# Patient Record
Sex: MALE | Race: WHITE | Employment: FULL TIME | URBAN - METROPOLITAN AREA
[De-identification: names, ages, dates, MRNs, and addresses within clinical notes are randomized per-mention and may not be internally consistent; named-entity substitution may affect disease eponyms.]

---

## 2018-04-12 RX ORDER — PROPRANOLOL/HYDROCHLOROTHIAZID 40 MG-25MG
1 TABLET ORAL DAILY
COMMUNITY

## 2018-04-16 PROCEDURE — 87081 CULTURE SCREEN ONLY: CPT | Performed by: INTERNAL MEDICINE

## 2018-04-16 PROCEDURE — 81003 URINALYSIS AUTO W/O SCOPE: CPT | Performed by: INTERNAL MEDICINE

## 2018-04-25 PROBLEM — K76.0 FATTY LIVER: Status: ACTIVE | Noted: 2018-04-25

## 2018-04-29 ENCOUNTER — ANESTHESIA EVENT (OUTPATIENT)
Dept: SURGERY | Facility: HOSPITAL | Age: 63
DRG: 472 | End: 2018-04-29
Payer: COMMERCIAL

## 2018-04-29 NOTE — ANESTHESIA PREPROCEDURE EVALUATION
PRE-OP EVALUATION    Patient Name: Jovita Patel    Pre-op Diagnosis: Cervical spinal stenosis [M48.02]    Procedure(s):  cervical 3-cervical 4, cervical 4-cervical 5, cervical 5-cervical 6 anterior cervical discectomy fusion        Surgeon(s) and Rol Alcohol use Yes    Comment: social       Drug use: No     Available pre-op labs reviewed.     Lab Results  Component Value Date   WBC 9.89 04/16/2018   RBC 4.83 04/16/2018   HGB 13.9 04/16/2018   HCT 42.2 04/16/2018   MCV 87.4 04/16/2018   MCH 28.8 04/16

## 2018-04-30 ENCOUNTER — APPOINTMENT (OUTPATIENT)
Dept: GENERAL RADIOLOGY | Facility: HOSPITAL | Age: 63
DRG: 472 | End: 2018-04-30
Attending: ORTHOPAEDIC SURGERY
Payer: COMMERCIAL

## 2018-04-30 ENCOUNTER — SURGERY (OUTPATIENT)
Age: 63
End: 2018-04-30

## 2018-04-30 ENCOUNTER — HOSPITAL ENCOUNTER (INPATIENT)
Facility: HOSPITAL | Age: 63
LOS: 1 days | Discharge: HOME | DRG: 472 | End: 2018-05-01
Attending: ORTHOPAEDIC SURGERY | Admitting: ORTHOPAEDIC SURGERY
Payer: COMMERCIAL

## 2018-04-30 ENCOUNTER — ANESTHESIA (OUTPATIENT)
Dept: SURGERY | Facility: HOSPITAL | Age: 63
DRG: 472 | End: 2018-04-30
Payer: COMMERCIAL

## 2018-04-30 DIAGNOSIS — M54.2 NECK PAIN: ICD-10-CM

## 2018-04-30 DIAGNOSIS — M48.02 CERVICAL SPINAL STENOSIS: ICD-10-CM

## 2018-04-30 DIAGNOSIS — M54.5 LOW BACK PAIN, UNSPECIFIED BACK PAIN LATERALITY, UNSPECIFIED CHRONICITY, WITH SCIATICA PRESENCE UNSPECIFIED: ICD-10-CM

## 2018-04-30 PROCEDURE — 0RB30ZZ EXCISION OF CERVICAL VERTEBRAL DISC, OPEN APPROACH: ICD-10-PCS | Performed by: ORTHOPAEDIC SURGERY

## 2018-04-30 PROCEDURE — 88311 DECALCIFY TISSUE: CPT | Performed by: ORTHOPAEDIC SURGERY

## 2018-04-30 PROCEDURE — 36415 COLL VENOUS BLD VENIPUNCTURE: CPT | Performed by: PHYSICIAN ASSISTANT

## 2018-04-30 PROCEDURE — A4216 STERILE WATER/SALINE, 10 ML: HCPCS

## 2018-04-30 PROCEDURE — 95861 NEEDLE EMG 2 EXTREMITIES: CPT | Performed by: ORTHOPAEDIC SURGERY

## 2018-04-30 PROCEDURE — 88304 TISSUE EXAM BY PATHOLOGIST: CPT | Performed by: ORTHOPAEDIC SURGERY

## 2018-04-30 PROCEDURE — 83735 ASSAY OF MAGNESIUM: CPT | Performed by: HOSPITALIST

## 2018-04-30 PROCEDURE — 0RG20K0 FUSION OF 2 OR MORE CERVICAL VERTEBRAL JOINTS WITH NONAUTOLOGOUS TISSUE SUBSTITUTE, ANTERIOR APPROACH, ANTERIOR COLUMN, OPEN APPROACH: ICD-10-PCS | Performed by: ORTHOPAEDIC SURGERY

## 2018-04-30 PROCEDURE — 95938 SOMATOSENSORY TESTING: CPT | Performed by: ORTHOPAEDIC SURGERY

## 2018-04-30 PROCEDURE — 85027 COMPLETE CBC AUTOMATED: CPT | Performed by: PHYSICIAN ASSISTANT

## 2018-04-30 PROCEDURE — 00BT0ZZ EXCISION OF SPINAL MENINGES, OPEN APPROACH: ICD-10-PCS | Performed by: ORTHOPAEDIC SURGERY

## 2018-04-30 PROCEDURE — 80048 BASIC METABOLIC PNL TOTAL CA: CPT | Performed by: HOSPITALIST

## 2018-04-30 PROCEDURE — 95940 IONM IN OPERATNG ROOM 15 MIN: CPT | Performed by: ORTHOPAEDIC SURGERY

## 2018-04-30 PROCEDURE — 95939 C MOTOR EVOKED UPR&LWR LIMBS: CPT | Performed by: ORTHOPAEDIC SURGERY

## 2018-04-30 PROCEDURE — 4A11X4G MONITORING OF PERIPHERAL NERVOUS ELECTRICAL ACTIVITY, INTRAOPERATIVE, EXTERNAL APPROACH: ICD-10-PCS | Performed by: ORTHOPAEDIC SURGERY

## 2018-04-30 PROCEDURE — 94660 CPAP INITIATION&MGMT: CPT

## 2018-04-30 PROCEDURE — 76001 XR FLUOROSCOPE EXAM >1 HR EXTENSIVE (CPT=76001): CPT | Performed by: ORTHOPAEDIC SURGERY

## 2018-04-30 DEVICE — ARCHON SCRW 4.0X15 SLF-TP VAR: Type: IMPLANTABLE DEVICE | Site: NECK | Status: FUNCTIONAL

## 2018-04-30 DEVICE — OSTEOCEL PRO BONE MATRIX SM: Type: IMPLANTABLE DEVICE | Site: NECK | Status: FUNCTIONAL

## 2018-04-30 DEVICE — ARCHON SCRW 4.5X15 SLF-TP VAR: Type: IMPLANTABLE DEVICE | Site: NECK | Status: FUNCTIONAL

## 2018-04-30 DEVICE — COROENT ACR MRKR 6X17X14 10DEG: Type: IMPLANTABLE DEVICE | Site: NECK | Status: FUNCTIONAL

## 2018-04-30 DEVICE — IMPLANTABLE DEVICE: Type: IMPLANTABLE DEVICE | Site: NECK | Status: FUNCTIONAL

## 2018-04-30 RX ORDER — BACITRACIN 50000 [USP'U]/1
INJECTION, POWDER, LYOPHILIZED, FOR SOLUTION INTRAMUSCULAR AS NEEDED
Status: DISCONTINUED | OUTPATIENT
Start: 2018-04-30 | End: 2018-04-30 | Stop reason: HOSPADM

## 2018-04-30 RX ORDER — ONDANSETRON 2 MG/ML
4 INJECTION INTRAMUSCULAR; INTRAVENOUS EVERY 6 HOURS PRN
Status: DISCONTINUED | OUTPATIENT
Start: 2018-04-30 | End: 2018-05-01

## 2018-04-30 RX ORDER — DIPHENHYDRAMINE HYDROCHLORIDE 50 MG/ML
12.5 INJECTION INTRAMUSCULAR; INTRAVENOUS EVERY 4 HOURS PRN
Status: DISCONTINUED | OUTPATIENT
Start: 2018-04-30 | End: 2018-05-01

## 2018-04-30 RX ORDER — POLYETHYLENE GLYCOL 3350 17 G/17G
17 POWDER, FOR SOLUTION ORAL DAILY PRN
Status: DISCONTINUED | OUTPATIENT
Start: 2018-04-30 | End: 2018-05-01

## 2018-04-30 RX ORDER — SODIUM CHLORIDE, SODIUM LACTATE, POTASSIUM CHLORIDE, CALCIUM CHLORIDE 600; 310; 30; 20 MG/100ML; MG/100ML; MG/100ML; MG/100ML
INJECTION, SOLUTION INTRAVENOUS CONTINUOUS
Status: DISCONTINUED | OUTPATIENT
Start: 2018-04-30 | End: 2018-04-30 | Stop reason: HOSPADM

## 2018-04-30 RX ORDER — DIGOXIN 250 MCG
0.25 TABLET ORAL DAILY
Status: DISCONTINUED | OUTPATIENT
Start: 2018-05-01 | End: 2018-05-01

## 2018-04-30 RX ORDER — SODIUM PHOSPHATE, DIBASIC AND SODIUM PHOSPHATE, MONOBASIC 7; 19 G/133ML; G/133ML
1 ENEMA RECTAL ONCE AS NEEDED
Status: DISCONTINUED | OUTPATIENT
Start: 2018-04-30 | End: 2018-05-01

## 2018-04-30 RX ORDER — CELECOXIB 200 MG/1
200 CAPSULE ORAL ONCE
Status: COMPLETED | OUTPATIENT
Start: 2018-04-30 | End: 2018-04-30

## 2018-04-30 RX ORDER — MIDAZOLAM HYDROCHLORIDE 1 MG/ML
INJECTION INTRAMUSCULAR; INTRAVENOUS
Status: COMPLETED
Start: 2018-04-30 | End: 2018-04-30

## 2018-04-30 RX ORDER — SODIUM CHLORIDE 9 MG/ML
INJECTION, SOLUTION INTRAVENOUS CONTINUOUS
Status: DISCONTINUED | OUTPATIENT
Start: 2018-04-30 | End: 2018-05-01

## 2018-04-30 RX ORDER — CEFAZOLIN SODIUM/WATER 2 G/20 ML
2 SYRINGE (ML) INTRAVENOUS EVERY 8 HOURS
Status: COMPLETED | OUTPATIENT
Start: 2018-04-30 | End: 2018-04-30

## 2018-04-30 RX ORDER — LEVOCETIRIZINE DIHYDROCHLORIDE 5 MG/1
5 TABLET, FILM COATED ORAL DAILY
COMMUNITY

## 2018-04-30 RX ORDER — DIPHENHYDRAMINE HYDROCHLORIDE 50 MG/ML
25 INJECTION INTRAMUSCULAR; INTRAVENOUS EVERY 4 HOURS PRN
Status: DISCONTINUED | OUTPATIENT
Start: 2018-04-30 | End: 2018-05-01

## 2018-04-30 RX ORDER — HYDROCODONE BITARTRATE AND ACETAMINOPHEN 10; 325 MG/1; MG/1
2 TABLET ORAL EVERY 4 HOURS PRN
Status: DISCONTINUED | OUTPATIENT
Start: 2018-04-30 | End: 2018-05-01

## 2018-04-30 RX ORDER — METOPROLOL SUCCINATE 50 MG/1
50 TABLET, EXTENDED RELEASE ORAL
Status: DISCONTINUED | OUTPATIENT
Start: 2018-05-01 | End: 2018-05-01

## 2018-04-30 RX ORDER — DEXAMETHASONE SODIUM PHOSPHATE 10 MG/ML
10 INJECTION, SOLUTION INTRAMUSCULAR; INTRAVENOUS EVERY 8 HOURS
Status: COMPLETED | OUTPATIENT
Start: 2018-04-30 | End: 2018-05-01

## 2018-04-30 RX ORDER — ACETAMINOPHEN 500 MG
500 TABLET ORAL ONCE
Status: ON HOLD | COMMUNITY
End: 2018-05-01

## 2018-04-30 RX ORDER — DOCUSATE SODIUM 100 MG/1
100 CAPSULE, LIQUID FILLED ORAL 2 TIMES DAILY
Status: DISCONTINUED | OUTPATIENT
Start: 2018-04-30 | End: 2018-05-01

## 2018-04-30 RX ORDER — HYDROMORPHONE HYDROCHLORIDE 1 MG/ML
0.4 INJECTION, SOLUTION INTRAMUSCULAR; INTRAVENOUS; SUBCUTANEOUS EVERY 30 MIN PRN
Status: DISCONTINUED | OUTPATIENT
Start: 2018-04-30 | End: 2018-05-01

## 2018-04-30 RX ORDER — DIPHENHYDRAMINE HCL 25 MG
25 CAPSULE ORAL EVERY 4 HOURS PRN
Status: DISCONTINUED | OUTPATIENT
Start: 2018-04-30 | End: 2018-05-01

## 2018-04-30 RX ORDER — ACETAMINOPHEN 500 MG
1000 TABLET ORAL ONCE
Status: DISCONTINUED | OUTPATIENT
Start: 2018-04-30 | End: 2018-04-30 | Stop reason: HOSPADM

## 2018-04-30 RX ORDER — HYDROCODONE BITARTRATE AND ACETAMINOPHEN 10; 325 MG/1; MG/1
1 TABLET ORAL EVERY 4 HOURS PRN
Status: DISCONTINUED | OUTPATIENT
Start: 2018-04-30 | End: 2018-05-01

## 2018-04-30 RX ORDER — NALOXONE HYDROCHLORIDE 0.4 MG/ML
80 INJECTION, SOLUTION INTRAMUSCULAR; INTRAVENOUS; SUBCUTANEOUS AS NEEDED
Status: DISCONTINUED | OUTPATIENT
Start: 2018-04-30 | End: 2018-04-30 | Stop reason: HOSPADM

## 2018-04-30 RX ORDER — DIAZEPAM 5 MG/1
5 TABLET ORAL EVERY 6 HOURS PRN
Status: DISCONTINUED | OUTPATIENT
Start: 2018-04-30 | End: 2018-05-01

## 2018-04-30 RX ORDER — CETIRIZINE HYDROCHLORIDE 10 MG/1
10 TABLET ORAL DAILY
Status: DISCONTINUED | OUTPATIENT
Start: 2018-05-01 | End: 2018-05-01

## 2018-04-30 RX ORDER — BISACODYL 10 MG
10 SUPPOSITORY, RECTAL RECTAL
Status: DISCONTINUED | OUTPATIENT
Start: 2018-04-30 | End: 2018-05-01

## 2018-04-30 RX ORDER — METOCLOPRAMIDE HYDROCHLORIDE 5 MG/ML
10 INJECTION INTRAMUSCULAR; INTRAVENOUS EVERY 6 HOURS PRN
Status: DISCONTINUED | OUTPATIENT
Start: 2018-04-30 | End: 2018-05-01

## 2018-04-30 RX ORDER — CEFAZOLIN SODIUM/WATER 2 G/20 ML
2 SYRINGE (ML) INTRAVENOUS ONCE
Status: COMPLETED | OUTPATIENT
Start: 2018-04-30 | End: 2018-04-30

## 2018-04-30 RX ORDER — CYCLOBENZAPRINE HCL 10 MG
10 TABLET ORAL 3 TIMES DAILY PRN
Status: DISCONTINUED | OUTPATIENT
Start: 2018-04-30 | End: 2018-05-01

## 2018-04-30 RX ORDER — ONDANSETRON 2 MG/ML
4 INJECTION INTRAMUSCULAR; INTRAVENOUS AS NEEDED
Status: DISCONTINUED | OUTPATIENT
Start: 2018-04-30 | End: 2018-04-30 | Stop reason: HOSPADM

## 2018-04-30 RX ORDER — ACETAMINOPHEN 500 MG
1000 TABLET ORAL ONCE
Status: ON HOLD | COMMUNITY
End: 2018-04-30

## 2018-04-30 RX ORDER — ONDANSETRON 2 MG/ML
4 INJECTION INTRAMUSCULAR; INTRAVENOUS EVERY 4 HOURS PRN
Status: ACTIVE | OUTPATIENT
Start: 2018-04-30 | End: 2018-05-01

## 2018-04-30 RX ORDER — GABAPENTIN 600 MG/1
600 TABLET ORAL ONCE
Status: COMPLETED | OUTPATIENT
Start: 2018-04-30 | End: 2018-04-30

## 2018-04-30 RX ORDER — MIDAZOLAM HYDROCHLORIDE 1 MG/ML
1 INJECTION INTRAMUSCULAR; INTRAVENOUS EVERY 5 MIN PRN
Status: DISCONTINUED | OUTPATIENT
Start: 2018-04-30 | End: 2018-04-30 | Stop reason: HOSPADM

## 2018-04-30 RX ORDER — SENNOSIDES 8.6 MG
17.2 TABLET ORAL NIGHTLY
Status: DISCONTINUED | OUTPATIENT
Start: 2018-04-30 | End: 2018-05-01

## 2018-04-30 RX ORDER — NALOXONE HYDROCHLORIDE 0.4 MG/ML
0.08 INJECTION, SOLUTION INTRAMUSCULAR; INTRAVENOUS; SUBCUTANEOUS
Status: DISCONTINUED | OUTPATIENT
Start: 2018-04-30 | End: 2018-05-01

## 2018-04-30 RX ORDER — SODIUM CHLORIDE, SODIUM LACTATE, POTASSIUM CHLORIDE, CALCIUM CHLORIDE 600; 310; 30; 20 MG/100ML; MG/100ML; MG/100ML; MG/100ML
INJECTION, SOLUTION INTRAVENOUS CONTINUOUS
Status: DISCONTINUED | OUTPATIENT
Start: 2018-04-30 | End: 2018-05-01

## 2018-04-30 RX ORDER — ONDANSETRON 2 MG/ML
4 INJECTION INTRAMUSCULAR; INTRAVENOUS ONCE
Status: COMPLETED | OUTPATIENT
Start: 2018-04-30 | End: 2018-04-30

## 2018-04-30 RX ORDER — ECHINACEA PURPUREA EXTRACT 125 MG
1 TABLET ORAL
Status: DISCONTINUED | OUTPATIENT
Start: 2018-04-30 | End: 2018-05-01

## 2018-04-30 RX ORDER — HYDROMORPHONE HYDROCHLORIDE 1 MG/ML
0.4 INJECTION, SOLUTION INTRAMUSCULAR; INTRAVENOUS; SUBCUTANEOUS EVERY 5 MIN PRN
Status: DISCONTINUED | OUTPATIENT
Start: 2018-04-30 | End: 2018-04-30 | Stop reason: HOSPADM

## 2018-04-30 RX ORDER — MEPERIDINE HYDROCHLORIDE 25 MG/ML
12.5 INJECTION INTRAMUSCULAR; INTRAVENOUS; SUBCUTANEOUS AS NEEDED
Status: DISCONTINUED | OUTPATIENT
Start: 2018-04-30 | End: 2018-04-30 | Stop reason: HOSPADM

## 2018-04-30 RX ORDER — NALBUPHINE HCL 10 MG/ML
2.5 AMPUL (ML) INJECTION EVERY 4 HOURS PRN
Status: DISCONTINUED | OUTPATIENT
Start: 2018-04-30 | End: 2018-05-01

## 2018-04-30 NOTE — CONSULTS
[facility] hospitalist initial consult  PCP;  Tiffanie Garcia MD  Consulted at request of Dr. Petty Garcia  Reason for consult medical co-management  HPI 57 yo male with many medical problems including but not limited to  hx of HTN, HL, HOLLAND, SVT, psoriasis here s/p cervical facility-administered medications on file prior to encounter. Current Outpatient Prescriptions on File Prior to Encounter:  digoxin 0.25 MG Oral Tab Take 1 tablet (0.25 mg total) by mouth daily.  Disp: 90 tablet Rfl: 3   Metoprolol Succinate ER 50 MG Oral [facility] Hospitalist  [phone]

## 2018-04-30 NOTE — PROGRESS NOTES
S:   Denies difficulty breathing or swallowing  He denies numbness or extremity pain    Inspection: Awake Alert  No acute distress. Blood pressure 120/75, pulse 92, temperature 97.7 °F (36.5 °C), temperature source Oral, resp.  rate 18, height 5' 9\" (1

## 2018-04-30 NOTE — CONSULTS
[facility] Pharmacy Note:  Pain Consult    Briana Akins is a 58year old male started on Dilaudid PCA by ARNOLDO Voss. Pharmacy was consulted to review medication profile and to discontinue previously ordered narcotics and sedatives.     Medication p

## 2018-04-30 NOTE — INTERVAL H&P NOTE
Pre-op Diagnosis: Cervical spinal stenosis [M48.02]    The above referenced H&P was reviewed by Diego Wetzel MD on 4/30/2018, the patient was examined and no significant changes have occurred in the patient's condition since the H&P was performed.   I dis

## 2018-04-30 NOTE — ANESTHESIA POSTPROCEDURE EVALUATION
Candace Patient Status:  Surgery Admit   Age/Gender 58year old male MRN [id number]   [facility] SURGERY Attending Pernell Mcknight MD   Hosp Day # 0 PCP Cyn Bolaños MD       Anesthesia Post-op Note    Procedure(s

## 2018-04-30 NOTE — BRIEF OP NOTE
Pre-Operative Diagnosis: Cervical spinal stenosis [M48.02]     Post-Operative Diagnosis: Cervical spinal stenosis [M48.02]      Procedure Performed:   Procedure(s):  cervical 3-cervical 4, cervical 4-cervical 5, cervical 5-cervical 6 anterior cervical disc

## 2018-04-30 NOTE — PROGRESS NOTES
Pt's PCP Dr Jose Luis Orellana, [facility]. Dr Guadalupe Cotto paged with new consult.

## 2018-05-01 VITALS
RESPIRATION RATE: 18 BRPM | TEMPERATURE: 98 F | DIASTOLIC BLOOD PRESSURE: 78 MMHG | SYSTOLIC BLOOD PRESSURE: 145 MMHG | BODY MASS INDEX: 28.58 KG/M2 | OXYGEN SATURATION: 95 % | WEIGHT: 193 LBS | HEART RATE: 74 BPM | HEIGHT: 69 IN

## 2018-05-01 PROCEDURE — 85027 COMPLETE CBC AUTOMATED: CPT | Performed by: PHYSICIAN ASSISTANT

## 2018-05-01 PROCEDURE — 97535 SELF CARE MNGMENT TRAINING: CPT

## 2018-05-01 PROCEDURE — 97116 GAIT TRAINING THERAPY: CPT

## 2018-05-01 PROCEDURE — 97166 OT EVAL MOD COMPLEX 45 MIN: CPT

## 2018-05-01 PROCEDURE — 97161 PT EVAL LOW COMPLEX 20 MIN: CPT

## 2018-05-01 RX ORDER — TRAMADOL HYDROCHLORIDE 50 MG/1
50 TABLET ORAL EVERY 6 HOURS PRN
Status: DISCONTINUED | OUTPATIENT
Start: 2018-05-01 | End: 2018-05-01

## 2018-05-01 NOTE — PHYSICAL THERAPY NOTE
PHYSICAL THERAPY QUICK EVALUATION - INPATIENT    Room Number: 381/381-A  Evaluation Date: 5/1/2018  Presenting Problem: s/p C3-6 ACDF 4/30/18  Physician Order: PT Eval and Treat    Problem List  Active Problems:    Cervical spinal stenosis      Past Medi MOBILITY  How much difficulty does the patient currently have. ..  -   Turning over in bed (including adjusting bedclothes, sheets and blankets)?: None   -   Sitting down on and standing up from a chair with arms (e.g., wheelchair, bedside commode, etc.): N ACDF.  Pertinent comorbidities and personal factors impacting therapy include none. Pt currently demonstrating ind with transfers, amb on levels, simulated car transfers, and able to recite 3/3 spine precautions.  Functional outcome measures completed incl

## 2018-05-01 NOTE — RESPIRATORY THERAPY NOTE
HOLLAND Equipment Usage Summary :            Set Mode :AUTO CPAP W FLEX          Usage in Hours:6;56          90% Pressure (EPAP) : 14.1           90% Insp Pressure (IPAP);           AHI : 28.5          Supplemental Oxygen :      LPM

## 2018-05-01 NOTE — PAYOR COMM NOTE
--------------  ADMISSION REVIEW     Payor: [employer]  Subscriber #:  [id number]  Authorization Number: [id number]    Admit date: 4/30/18  Admit time: 26       Admitting Physician: Elizabeth Israel MD  Attending Physician:  Daiana Najera 4/30/2018 1150 Given 0.5 mg Intravenous Libby Thurman RN    4/30/2018 1128 Given (none) Intravenous Libby Thurman RN         0.9%  NaCl infusion     Date Action Dose Route User    4/30/2018 Metsa 21 (none) Intravenous [place]            5/1/18

## 2018-05-01 NOTE — PROGRESS NOTES
S:   Denies difficulty breathing or swallowing  Bilateral hand and foot numbness still present  Strength is improving. No arm pain. Some soreness with swallowing. No other concerns. He would like to go home today.     Inspection: Awake Alert  No acute d

## 2018-05-01 NOTE — OCCUPATIONAL THERAPY NOTE
OCCUPATIONAL THERAPY QUICK EVALUATION - INPATIENT    Room Number: 381/381-A  Evaluation Date: 5/1/2018     Type of Evaluation: Quick Eval  Presenting Problem:  (ACDF C3-6)    Physician Order: IP Consult to Occupational Therapy  Reason for Therapy:  ADL/IAD None                PAIN ASSESSMENT  Rating: Unable to rate  Location:  (shoulders, neck)  Management Techniques: Relaxation;Repositioning    COGNITION  WFL    RANGE OF MOTION AND STRENGTH ASSESSMENT  Upper extremity ROM : wrists elbows and fingers WFL; sh remind himself of spine precautions if he or family notices not adhering to precautions. Understanding voiced. Patient End of Session: Up in chair;Needs met;Call light within reach;RN aware of session/findings; All patient questions and concerns address to achieve the following goals:  Patient able to toilet transfer: with supervision using RW (simulated at chair)  Patient able to dress lower extremities: with min assist using adaptive equipment.   Patient/Caregiver able to demonstrate safety with ADLS: wi

## 2018-05-01 NOTE — PROGRESS NOTES
POD #1 spine newsletter and swallowing precautions preprovided to patient, wife and daughter. Reviewed indications, side effects of pain medication/narcotics and constipation prevention.  Stressed importance of increased fluids/roughage in diet, continued u

## 2018-05-01 NOTE — PROGRESS NOTES
[facility] hospitalist daily note  Patient was seen/examined on 5/1/18    S; no cehst pain, no SOB, no abd pain, no nausea/emesis, patient reports that he is voiding    Medications in EPIC    PE;   05/01/18  1206   BP: 145/78   Pulse: 74   Resp: 18   Temp: 97.9 °

## 2018-05-02 NOTE — PAYOR COMM NOTE
--------------  DISCHARGE REVIEW    [facility]  Subscriber #:  [id number]  Authorization Number: [id number]    Admit date: 4/30/18  Admit time:  26  Discharge Date: 5/1/2018  5:57 PM     Admitting Physician: MD Krzysztof Palma

## 2018-05-28 NOTE — OPERATIVE REPORT
[facility]    PATIENT'S NAME: Esa Roya   ATTENDING PHYSICIAN: Renate Moran M.D. OPERATING PHYSICIAN: Renate Moran M.D.    PATIENT ACCOUNT#:   [de-identified]    LOCATION:  [address] [place] [address]  MEDICAL RECORD #:   [id number]       DATE OF BIRTH: neutral position. Fluoroscopy was wheeled in to albina an oblique incision at the medial border of the left sternocleidomastoid muscle. The neck was sterilely prepped and draped. An incision was made with a #15 blade.   Bovie electrocautery was used for decompressive discectomy was undertaken. Undercutting technique was used to decompress behind the vertebral bodies. Endplates were prepared. Allograft cage was sized.   A PEEK interbody cage was sized, allograft was placed inside it, and it was impacted highly degenerative disc. Distraction was undertaken. We undercut and performed undercutting technique after removing uncovertebral joints and hypertrophy with Midas Lit bur.   Undercutting curettes and 1 and 2-mm Kerrisons were used to perform similar de

## 2018-11-29 PROCEDURE — 86803 HEPATITIS C AB TEST: CPT | Performed by: INTERNAL MEDICINE

## 2018-12-11 PROBLEM — R73.03 PRE-DIABETES: Status: ACTIVE | Noted: 2018-12-11

## 2018-12-11 PROBLEM — R77.9 ELEVATED BLOOD PROTEIN: Status: ACTIVE | Noted: 2018-12-11

## 2018-12-11 PROBLEM — R74.8 ELEVATED ALKALINE PHOSPHATASE LEVEL: Status: ACTIVE | Noted: 2018-12-11

## 2018-12-14 PROBLEM — R90.89 ABNORMAL CT OF BRAIN: Status: ACTIVE | Noted: 2018-12-14

## 2020-09-04 ENCOUNTER — TELEPHONE (OUTPATIENT)
Dept: CARDIOLOGY | Age: 65
End: 2020-09-04

## 2020-12-02 PROBLEM — E11.9 NEW ONSET TYPE 2 DIABETES MELLITUS (HCC): Status: ACTIVE | Noted: 2020-12-02

## 2020-12-10 ENCOUNTER — HOSPITAL ENCOUNTER (EMERGENCY)
Age: 65
Discharge: HOME | End: 2020-12-10
Attending: EMERGENCY MEDICINE
Payer: COMMERCIAL

## 2020-12-10 VITALS
SYSTOLIC BLOOD PRESSURE: 172 MMHG | DIASTOLIC BLOOD PRESSURE: 71 MMHG | WEIGHT: 187 LBS | HEIGHT: 69 IN | RESPIRATION RATE: 20 BRPM | HEART RATE: 89 BPM | OXYGEN SATURATION: 97 % | TEMPERATURE: 99 F | BODY MASS INDEX: 27.7 KG/M2

## 2020-12-10 DIAGNOSIS — H10.212 CHEMICAL CONJUNCTIVITIS OF LEFT EYE: Primary | ICD-10-CM

## 2020-12-10 PROCEDURE — 99283 EMERGENCY DEPT VISIT LOW MDM: CPT

## 2020-12-10 RX ORDER — TETRACAINE HYDROCHLORIDE 5 MG/ML
2 SOLUTION OPHTHALMIC ONCE
Status: COMPLETED | OUTPATIENT
Start: 2020-12-10 | End: 2020-12-10

## 2020-12-22 PROBLEM — R73.03 PRE-DIABETES: Status: RESOLVED | Noted: 2018-12-11 | Resolved: 2020-12-22

## 2021-03-07 ENCOUNTER — TELEPHONE (OUTPATIENT)
Dept: CARDIOLOGY | Age: 66
End: 2021-03-07

## 2021-03-08 ENCOUNTER — OFFICE VISIT (OUTPATIENT)
Dept: CARDIOLOGY | Age: 66
End: 2021-03-08

## 2021-03-08 ENCOUNTER — APPOINTMENT (OUTPATIENT)
Dept: CARDIOLOGY | Age: 66
End: 2021-03-08

## 2021-03-08 ENCOUNTER — TELEPHONE (OUTPATIENT)
Dept: CARDIOLOGY | Age: 66
End: 2021-03-08

## 2021-03-08 ENCOUNTER — LAB SERVICES (OUTPATIENT)
Dept: LAB | Age: 66
End: 2021-03-08

## 2021-03-08 VITALS
BODY MASS INDEX: 28.49 KG/M2 | HEIGHT: 68 IN | OXYGEN SATURATION: 98 % | HEART RATE: 84 BPM | WEIGHT: 188 LBS | SYSTOLIC BLOOD PRESSURE: 154 MMHG | DIASTOLIC BLOOD PRESSURE: 80 MMHG

## 2021-03-08 DIAGNOSIS — L40.9 PSORIASIS: ICD-10-CM

## 2021-03-08 DIAGNOSIS — E78.2 MIXED HYPERLIPIDEMIA: ICD-10-CM

## 2021-03-08 DIAGNOSIS — I47.10 SVT (SUPRAVENTRICULAR TACHYCARDIA): ICD-10-CM

## 2021-03-08 DIAGNOSIS — I47.10 SVT (SUPRAVENTRICULAR TACHYCARDIA): Primary | ICD-10-CM

## 2021-03-08 DIAGNOSIS — G47.33 OSA ON CPAP: ICD-10-CM

## 2021-03-08 DIAGNOSIS — E11.9 TYPE 2 DIABETES MELLITUS WITHOUT COMPLICATION, WITHOUT LONG-TERM CURRENT USE OF INSULIN (CMD): ICD-10-CM

## 2021-03-08 DIAGNOSIS — I10 ESSENTIAL HYPERTENSION: ICD-10-CM

## 2021-03-08 DIAGNOSIS — I10 ESSENTIAL HYPERTENSION, BENIGN: ICD-10-CM

## 2021-03-08 DIAGNOSIS — R06.09 DOE (DYSPNEA ON EXERTION): Primary | ICD-10-CM

## 2021-03-08 DIAGNOSIS — I87.2 VENOUS INSUFFICIENCY OF BOTH LOWER EXTREMITIES: ICD-10-CM

## 2021-03-08 DIAGNOSIS — Z76.89 ENCOUNTER TO ESTABLISH CARE: ICD-10-CM

## 2021-03-08 DIAGNOSIS — Z87.891 FORMER TOBACCO USE: ICD-10-CM

## 2021-03-08 PROBLEM — M48.02 CERVICAL SPINAL STENOSIS: Status: ACTIVE | Noted: 2021-03-08

## 2021-03-08 PROCEDURE — 93000 ELECTROCARDIOGRAM COMPLETE: CPT | Performed by: INTERNAL MEDICINE

## 2021-03-08 PROCEDURE — 99244 OFF/OP CNSLTJ NEW/EST MOD 40: CPT | Performed by: INTERNAL MEDICINE

## 2021-03-08 PROCEDURE — 3077F SYST BP >= 140 MM HG: CPT | Performed by: INTERNAL MEDICINE

## 2021-03-08 PROCEDURE — 3079F DIAST BP 80-89 MM HG: CPT | Performed by: INTERNAL MEDICINE

## 2021-03-08 RX ORDER — DIGOXIN 0.05 MG/ML
0.25 SOLUTION ORAL
COMMUNITY
End: 2021-09-29 | Stop reason: ALTCHOICE

## 2021-03-08 RX ORDER — VIT C/B6/B5/MAGNESIUM/HERB 173 50-5-6-5MG
1 CAPSULE ORAL
COMMUNITY

## 2021-03-08 RX ORDER — LEVOCETIRIZINE DIHYDROCHLORIDE 5 MG/1
5 TABLET, FILM COATED ORAL
COMMUNITY

## 2021-03-08 RX ORDER — METOPROLOL SUCCINATE 50 MG/1
50 TABLET, EXTENDED RELEASE ORAL DAILY
Qty: 90 TABLET | Refills: 3 | Status: SHIPPED | OUTPATIENT
Start: 2021-03-08 | End: 2021-03-09 | Stop reason: ALTCHOICE

## 2021-03-08 RX ORDER — CHLORAL HYDRATE 500 MG
1000 CAPSULE ORAL
COMMUNITY

## 2021-03-08 RX ORDER — ATORVASTATIN CALCIUM 20 MG/1
20 TABLET, FILM COATED ORAL DAILY
Qty: 30 TABLET | Refills: 3 | Status: SHIPPED | OUTPATIENT
Start: 2021-03-08 | End: 2021-03-10 | Stop reason: DRUGHIGH

## 2021-03-08 RX ORDER — EZETIMIBE 10 MG/1
10 TABLET ORAL
COMMUNITY
Start: 2020-12-01 | End: 2023-06-28 | Stop reason: ALTCHOICE

## 2021-03-08 RX ORDER — METOPROLOL SUCCINATE 50 MG/1
TABLET, EXTENDED RELEASE ORAL
COMMUNITY
End: 2021-03-08 | Stop reason: SDUPTHER

## 2021-03-08 ASSESSMENT — ENCOUNTER SYMPTOMS
EYES NEGATIVE: 1
HEMATOLOGIC/LYMPHATIC NEGATIVE: 1
NEUROLOGICAL NEGATIVE: 1
ALLERGIC/IMMUNOLOGIC NEGATIVE: 1
CONSTITUTIONAL NEGATIVE: 1
PSYCHIATRIC NEGATIVE: 1
SHORTNESS OF BREATH: 1
GASTROINTESTINAL NEGATIVE: 1
ENDOCRINE NEGATIVE: 1

## 2021-03-09 ENCOUNTER — TELEPHONE (OUTPATIENT)
Dept: CARDIOLOGY | Age: 66
End: 2021-03-09

## 2021-03-09 DIAGNOSIS — Z01.812 PRE-PROCEDURAL LABORATORY EXAMINATION: Primary | ICD-10-CM

## 2021-03-10 RX ORDER — ATORVASTATIN CALCIUM 20 MG/1
20 TABLET, FILM COATED ORAL
Qty: 30 TABLET | Refills: 3 | Status: SHIPPED | COMMUNITY
Start: 2021-03-15 | End: 2022-01-18

## 2021-07-23 ENCOUNTER — APPOINTMENT (OUTPATIENT)
Dept: ULTRASOUND IMAGING | Age: 66
End: 2021-07-23
Attending: INTERNAL MEDICINE

## 2021-08-25 ENCOUNTER — APPOINTMENT (OUTPATIENT)
Dept: ULTRASOUND IMAGING | Age: 66
End: 2021-08-25
Attending: INTERNAL MEDICINE

## 2021-09-10 ENCOUNTER — APPOINTMENT (OUTPATIENT)
Dept: ULTRASOUND IMAGING | Age: 66
End: 2021-09-10
Attending: INTERNAL MEDICINE

## 2021-09-28 ENCOUNTER — E-ADVICE (OUTPATIENT)
Dept: CARDIOLOGY | Age: 66
End: 2021-09-28

## 2021-09-28 DIAGNOSIS — I47.10 SVT (SUPRAVENTRICULAR TACHYCARDIA): ICD-10-CM

## 2021-09-28 DIAGNOSIS — R06.09 DOE (DYSPNEA ON EXERTION): Primary | ICD-10-CM

## 2021-09-28 DIAGNOSIS — I10 ESSENTIAL HYPERTENSION: ICD-10-CM

## 2021-09-28 DIAGNOSIS — Z01.812 PRE-PROCEDURAL LABORATORY EXAMINATION: Primary | ICD-10-CM

## 2021-09-28 DIAGNOSIS — E11.9 TYPE 2 DIABETES MELLITUS WITHOUT COMPLICATION, WITHOUT LONG-TERM CURRENT USE OF INSULIN (CMD): ICD-10-CM

## 2021-09-28 DIAGNOSIS — I87.2 VENOUS INSUFFICIENCY OF BOTH LOWER EXTREMITIES: ICD-10-CM

## 2021-09-29 ENCOUNTER — TELEPHONE (OUTPATIENT)
Dept: CARDIOLOGY | Age: 66
End: 2021-09-29

## 2021-09-29 DIAGNOSIS — I47.10 SVT (SUPRAVENTRICULAR TACHYCARDIA): ICD-10-CM

## 2021-09-29 DIAGNOSIS — I10 ESSENTIAL HYPERTENSION: Primary | ICD-10-CM

## 2021-09-29 DIAGNOSIS — I47.10 PSVT (PAROXYSMAL SUPRAVENTRICULAR TACHYCARDIA): ICD-10-CM

## 2021-09-29 DIAGNOSIS — E11.9 TYPE 2 DIABETES MELLITUS WITHOUT COMPLICATION, WITHOUT LONG-TERM CURRENT USE OF INSULIN (CMD): ICD-10-CM

## 2021-09-29 DIAGNOSIS — E78.2 MIXED HYPERLIPIDEMIA: ICD-10-CM

## 2021-09-29 RX ORDER — DIGOXIN 250 MCG
250 TABLET ORAL DAILY
Qty: 90 TABLET | Refills: 1 | Status: SHIPPED | OUTPATIENT
Start: 2021-09-29 | End: 2022-03-28 | Stop reason: SDUPTHER

## 2021-09-30 ENCOUNTER — TELEPHONE (OUTPATIENT)
Dept: CARDIOLOGY | Age: 66
End: 2021-09-30

## 2021-10-06 ENCOUNTER — E-ADVICE (OUTPATIENT)
Dept: CARDIOLOGY | Age: 66
End: 2021-10-06

## 2021-10-06 ENCOUNTER — ANCILLARY PROCEDURE (OUTPATIENT)
Dept: ULTRASOUND IMAGING | Age: 66
End: 2021-10-06
Attending: INTERNAL MEDICINE

## 2021-10-06 DIAGNOSIS — Z87.891 FORMER TOBACCO USE: ICD-10-CM

## 2021-10-06 DIAGNOSIS — Z76.89 ENCOUNTER TO ESTABLISH CARE: ICD-10-CM

## 2021-10-06 PROCEDURE — 76775 US EXAM ABDO BACK WALL LIM: CPT | Performed by: RADIOLOGY

## 2021-10-12 ENCOUNTER — LAB SERVICES (OUTPATIENT)
Dept: LAB | Age: 66
End: 2021-10-12

## 2021-10-12 DIAGNOSIS — Z01.812 PRE-PROCEDURAL LABORATORY EXAMINATION: ICD-10-CM

## 2021-10-12 LAB
SARS-COV-2 RNA RESP QL NAA+PROBE: NOT DETECTED
SERVICE CMNT-IMP: NORMAL
SERVICE CMNT-IMP: NORMAL

## 2021-10-12 PROCEDURE — U0005 INFEC AGEN DETEC AMPLI PROBE: HCPCS | Performed by: CLINICAL MEDICAL LABORATORY

## 2021-10-12 PROCEDURE — U0003 INFECTIOUS AGENT DETECTION BY NUCLEIC ACID (DNA OR RNA); SEVERE ACUTE RESPIRATORY SYNDROME CORONAVIRUS 2 (SARS-COV-2) (CORONAVIRUS DISEASE [COVID-19]), AMPLIFIED PROBE TECHNIQUE, MAKING USE OF HIGH THROUGHPUT TECHNOLOGIES AS DESCRIBED BY CMS-2020-01-R: HCPCS | Performed by: CLINICAL MEDICAL LABORATORY

## 2021-10-14 ENCOUNTER — APPOINTMENT (OUTPATIENT)
Dept: GENERAL RADIOLOGY | Age: 66
End: 2021-10-14
Attending: INTERNAL MEDICINE

## 2021-10-14 ENCOUNTER — APPOINTMENT (OUTPATIENT)
Dept: CARDIOLOGY | Age: 66
End: 2021-10-14
Attending: INTERNAL MEDICINE

## 2021-10-18 ENCOUNTER — APPOINTMENT (OUTPATIENT)
Dept: CARDIOLOGY | Age: 66
End: 2021-10-18
Attending: INTERNAL MEDICINE

## 2021-10-19 ENCOUNTER — ANCILLARY PROCEDURE (OUTPATIENT)
Dept: GENERAL RADIOLOGY | Age: 66
End: 2021-10-19
Attending: INTERNAL MEDICINE

## 2021-10-19 ENCOUNTER — ANCILLARY PROCEDURE (OUTPATIENT)
Dept: CARDIOLOGY | Age: 66
End: 2021-10-19
Attending: INTERNAL MEDICINE

## 2021-10-19 DIAGNOSIS — R06.09 DOE (DYSPNEA ON EXERTION): ICD-10-CM

## 2021-10-19 DIAGNOSIS — I47.10 SVT (SUPRAVENTRICULAR TACHYCARDIA): ICD-10-CM

## 2021-10-19 DIAGNOSIS — E11.9 TYPE 2 DIABETES MELLITUS WITHOUT COMPLICATION, WITHOUT LONG-TERM CURRENT USE OF INSULIN (CMD): ICD-10-CM

## 2021-10-19 DIAGNOSIS — I10 ESSENTIAL HYPERTENSION: ICD-10-CM

## 2021-10-19 DIAGNOSIS — I87.2 VENOUS INSUFFICIENCY OF BOTH LOWER EXTREMITIES: ICD-10-CM

## 2021-10-19 DIAGNOSIS — I10 ESSENTIAL HYPERTENSION: Primary | ICD-10-CM

## 2021-10-19 PROCEDURE — A9500 TC99M SESTAMIBI: HCPCS | Performed by: INTERNAL MEDICINE

## 2021-10-19 PROCEDURE — 93015 CV STRESS TEST SUPVJ I&R: CPT | Performed by: INTERNAL MEDICINE

## 2021-10-19 PROCEDURE — 78452 HT MUSCLE IMAGE SPECT MULT: CPT | Performed by: INTERNAL MEDICINE

## 2021-10-19 PROCEDURE — G1004 CDSM NDSC: HCPCS | Performed by: INTERNAL MEDICINE

## 2021-10-19 RX ORDER — TETRAKIS(2-METHOXYISOBUTYLISOCYANIDE)COPPER(I) TETRAFLUOROBORATE 1 MG/ML
24 INJECTION, POWDER, LYOPHILIZED, FOR SOLUTION INTRAVENOUS ONCE
Status: COMPLETED | OUTPATIENT
Start: 2021-10-19 | End: 2021-10-19

## 2021-10-19 RX ORDER — REGADENOSON 0.08 MG/ML
0.4 INJECTION, SOLUTION INTRAVENOUS ONCE
Status: COMPLETED | OUTPATIENT
Start: 2021-10-19 | End: 2021-10-19

## 2021-10-19 RX ORDER — TETRAKIS(2-METHOXYISOBUTYLISOCYANIDE)COPPER(I) TETRAFLUOROBORATE 1 MG/ML
8.9 INJECTION, POWDER, LYOPHILIZED, FOR SOLUTION INTRAVENOUS ONCE
Status: COMPLETED | OUTPATIENT
Start: 2021-10-19 | End: 2021-10-19

## 2021-10-19 RX ADMIN — TETRAKIS(2-METHOXYISOBUTYLISOCYANIDE)COPPER(I) TETRAFLUOROBORATE 8.9 MILLICURIE: 1 INJECTION, POWDER, LYOPHILIZED, FOR SOLUTION INTRAVENOUS at 07:45

## 2021-10-19 RX ADMIN — TETRAKIS(2-METHOXYISOBUTYLISOCYANIDE)COPPER(I) TETRAFLUOROBORATE 23.5 MILLICURIE: 1 INJECTION, POWDER, LYOPHILIZED, FOR SOLUTION INTRAVENOUS at 09:35

## 2021-10-19 RX ADMIN — REGADENOSON 0.4 MG: 0.08 INJECTION, SOLUTION INTRAVENOUS at 09:24

## 2021-10-20 ENCOUNTER — TELEPHONE (OUTPATIENT)
Dept: CARDIOLOGY | Age: 66
End: 2021-10-20

## 2021-10-20 LAB — STRESS TARGET HR: 153 BPM

## 2021-10-28 ENCOUNTER — APPOINTMENT (OUTPATIENT)
Dept: CARDIOLOGY | Age: 66
End: 2021-10-28
Attending: INTERNAL MEDICINE

## 2021-11-01 ENCOUNTER — APPOINTMENT (OUTPATIENT)
Dept: CARDIOLOGY | Age: 66
End: 2021-11-01
Attending: INTERNAL MEDICINE

## 2021-11-05 ENCOUNTER — ANCILLARY PROCEDURE (OUTPATIENT)
Dept: CARDIOLOGY | Age: 66
End: 2021-11-05
Attending: INTERNAL MEDICINE

## 2021-11-05 DIAGNOSIS — E11.9 TYPE 2 DIABETES MELLITUS WITHOUT COMPLICATION, WITHOUT LONG-TERM CURRENT USE OF INSULIN (CMD): ICD-10-CM

## 2021-11-05 DIAGNOSIS — I47.10 SVT (SUPRAVENTRICULAR TACHYCARDIA): ICD-10-CM

## 2021-11-05 DIAGNOSIS — R06.09 DOE (DYSPNEA ON EXERTION): ICD-10-CM

## 2021-11-05 DIAGNOSIS — I87.2 VENOUS INSUFFICIENCY OF BOTH LOWER EXTREMITIES: ICD-10-CM

## 2021-11-05 DIAGNOSIS — I10 ESSENTIAL HYPERTENSION: ICD-10-CM

## 2021-11-05 PROCEDURE — 93306 TTE W/DOPPLER COMPLETE: CPT | Performed by: INTERNAL MEDICINE

## 2021-11-11 ENCOUNTER — TELEPHONE (OUTPATIENT)
Dept: CARDIOLOGY | Age: 66
End: 2021-11-11

## 2021-12-15 ENCOUNTER — APPOINTMENT (OUTPATIENT)
Dept: CARDIOLOGY | Age: 66
End: 2021-12-15

## 2021-12-15 ASSESSMENT — ENCOUNTER SYMPTOMS
NEUROLOGICAL NEGATIVE: 1
CONSTITUTIONAL NEGATIVE: 1
EYES NEGATIVE: 1
HEMATOLOGIC/LYMPHATIC NEGATIVE: 1
PSYCHIATRIC NEGATIVE: 1
GASTROINTESTINAL NEGATIVE: 1
ALLERGIC/IMMUNOLOGIC NEGATIVE: 1
ENDOCRINE NEGATIVE: 1
SHORTNESS OF BREATH: 1

## 2022-01-18 RX ORDER — ATORVASTATIN CALCIUM 20 MG/1
TABLET, FILM COATED ORAL
Qty: 90 TABLET | Refills: 3 | Status: SHIPPED | OUTPATIENT
Start: 2022-01-18 | End: 2022-11-17 | Stop reason: SDUPTHER

## 2022-01-25 ENCOUNTER — APPOINTMENT (OUTPATIENT)
Dept: CARDIOLOGY | Age: 67
End: 2022-01-25
Attending: INTERNAL MEDICINE

## 2022-02-05 ASSESSMENT — ENCOUNTER SYMPTOMS
HEMATOLOGIC/LYMPHATIC NEGATIVE: 1
PSYCHIATRIC NEGATIVE: 1
EYES NEGATIVE: 1
ENDOCRINE NEGATIVE: 1
SHORTNESS OF BREATH: 1
CONSTITUTIONAL NEGATIVE: 1
GASTROINTESTINAL NEGATIVE: 1
NEUROLOGICAL NEGATIVE: 1
ALLERGIC/IMMUNOLOGIC NEGATIVE: 1

## 2022-02-07 ENCOUNTER — OFFICE VISIT (OUTPATIENT)
Dept: CARDIOLOGY | Age: 67
End: 2022-02-07

## 2022-02-07 DIAGNOSIS — I10 ESSENTIAL HYPERTENSION, BENIGN: ICD-10-CM

## 2022-02-07 DIAGNOSIS — E11.9 TYPE 2 DIABETES MELLITUS WITHOUT COMPLICATION, WITHOUT LONG-TERM CURRENT USE OF INSULIN (CMD): ICD-10-CM

## 2022-02-07 DIAGNOSIS — E78.2 MIXED HYPERLIPIDEMIA: ICD-10-CM

## 2022-02-07 DIAGNOSIS — I47.10 SVT (SUPRAVENTRICULAR TACHYCARDIA): Primary | ICD-10-CM

## 2022-02-07 DIAGNOSIS — I87.2 VENOUS INSUFFICIENCY (CHRONIC) (PERIPHERAL): ICD-10-CM

## 2022-02-07 DIAGNOSIS — I47.10 PSVT (PAROXYSMAL SUPRAVENTRICULAR TACHYCARDIA): ICD-10-CM

## 2022-02-07 PROCEDURE — 93000 ELECTROCARDIOGRAM COMPLETE: CPT | Performed by: INTERNAL MEDICINE

## 2022-02-09 ASSESSMENT — ENCOUNTER SYMPTOMS
PSYCHIATRIC NEGATIVE: 1
EYES NEGATIVE: 1
ENDOCRINE NEGATIVE: 1
ALLERGIC/IMMUNOLOGIC NEGATIVE: 1
HEMATOLOGIC/LYMPHATIC NEGATIVE: 1
GASTROINTESTINAL NEGATIVE: 1
CONSTITUTIONAL NEGATIVE: 1
NEUROLOGICAL NEGATIVE: 1

## 2022-02-10 ENCOUNTER — OFFICE VISIT (OUTPATIENT)
Dept: CARDIOLOGY | Age: 67
End: 2022-02-10

## 2022-02-10 ENCOUNTER — ANCILLARY PROCEDURE (OUTPATIENT)
Dept: CARDIOLOGY | Age: 67
End: 2022-02-10
Attending: INTERNAL MEDICINE

## 2022-02-10 VITALS
SYSTOLIC BLOOD PRESSURE: 138 MMHG | HEART RATE: 65 BPM | WEIGHT: 183 LBS | DIASTOLIC BLOOD PRESSURE: 76 MMHG | BODY MASS INDEX: 27.74 KG/M2 | HEIGHT: 68 IN | OXYGEN SATURATION: 98 %

## 2022-02-10 DIAGNOSIS — I47.10 PSVT (PAROXYSMAL SUPRAVENTRICULAR TACHYCARDIA): ICD-10-CM

## 2022-02-10 DIAGNOSIS — E78.2 MIXED HYPERLIPIDEMIA: ICD-10-CM

## 2022-02-10 DIAGNOSIS — I10 ESSENTIAL HYPERTENSION, BENIGN: ICD-10-CM

## 2022-02-10 DIAGNOSIS — L40.9 PSORIASIS: ICD-10-CM

## 2022-02-10 DIAGNOSIS — I47.10 SVT (SUPRAVENTRICULAR TACHYCARDIA): ICD-10-CM

## 2022-02-10 DIAGNOSIS — I47.10 SVT (SUPRAVENTRICULAR TACHYCARDIA): Primary | ICD-10-CM

## 2022-02-10 DIAGNOSIS — E11.9 TYPE 2 DIABETES MELLITUS WITHOUT COMPLICATION, WITHOUT LONG-TERM CURRENT USE OF INSULIN (CMD): ICD-10-CM

## 2022-02-10 PROCEDURE — 99214 OFFICE O/P EST MOD 30 MIN: CPT | Performed by: INTERNAL MEDICINE

## 2022-02-10 RX ORDER — BLOOD-GLUCOSE METER
EACH MISCELLANEOUS
COMMUNITY
Start: 2021-11-30

## 2022-02-10 RX ORDER — LANCETS
EACH MISCELLANEOUS
COMMUNITY
Start: 2022-01-12

## 2022-02-11 ENCOUNTER — TELEPHONE (OUTPATIENT)
Dept: CARDIOLOGY | Age: 67
End: 2022-02-11

## 2022-02-11 ASSESSMENT — ENCOUNTER SYMPTOMS: RESPIRATORY NEGATIVE: 1

## 2022-03-14 ENCOUNTER — APPOINTMENT (OUTPATIENT)
Dept: CARDIOLOGY | Age: 67
End: 2022-03-14
Attending: INTERNAL MEDICINE

## 2022-03-28 DIAGNOSIS — I48.91 ATRIAL FIBRILLATION, UNSPECIFIED TYPE (CMD): ICD-10-CM

## 2022-03-28 DIAGNOSIS — I47.10 SVT (SUPRAVENTRICULAR TACHYCARDIA): ICD-10-CM

## 2022-03-29 RX ORDER — DIGOXIN 250 MCG
250 TABLET ORAL DAILY
Qty: 30 TABLET | Refills: 0 | Status: SHIPPED | OUTPATIENT
Start: 2022-03-29 | End: 2022-04-25 | Stop reason: SDUPTHER

## 2022-04-22 ENCOUNTER — LAB SERVICES (OUTPATIENT)
Dept: LAB | Age: 67
End: 2022-04-22

## 2022-04-22 ENCOUNTER — LAB REQUISITION (OUTPATIENT)
Dept: LAB | Age: 67
End: 2022-04-22

## 2022-04-22 DIAGNOSIS — I47.10 SVT (SUPRAVENTRICULAR TACHYCARDIA): ICD-10-CM

## 2022-04-22 DIAGNOSIS — I47.10 SUPRAVENTRICULAR TACHYCARDIA: ICD-10-CM

## 2022-04-22 LAB
DIGOXIN SERPL-MCNC: 2 NG/ML (ref 0.8–2.1)
DIGOXIN SERPL-MCNC: 2 NG/ML (ref 0.8–2.1)
DIGOXIN: NORMAL NG/ML

## 2022-04-22 PROCEDURE — 80162 ASSAY OF DIGOXIN TOTAL: CPT | Performed by: CLINICAL MEDICAL LABORATORY

## 2022-04-22 PROCEDURE — 36415 COLL VENOUS BLD VENIPUNCTURE: CPT | Performed by: INTERNAL MEDICINE

## 2022-04-25 RX ORDER — DIGOXIN 250 MCG
250 TABLET ORAL DAILY
Qty: 90 TABLET | Refills: 1 | Status: SHIPPED | OUTPATIENT
Start: 2022-04-25 | End: 2022-10-14

## 2022-10-13 ENCOUNTER — TELEPHONE (OUTPATIENT)
Dept: CARDIOLOGY | Age: 67
End: 2022-10-13

## 2022-10-13 DIAGNOSIS — I47.10 SVT (SUPRAVENTRICULAR TACHYCARDIA): Primary | ICD-10-CM

## 2022-10-13 DIAGNOSIS — Z79.899 MEDICATION MANAGEMENT: ICD-10-CM

## 2022-10-14 RX ORDER — DIGOXIN 250 MCG
TABLET ORAL
Qty: 90 TABLET | Refills: 0 | Status: SHIPPED | OUTPATIENT
Start: 2022-10-14 | End: 2022-12-18 | Stop reason: SDUPTHER

## 2022-11-17 RX ORDER — ATORVASTATIN CALCIUM 20 MG/1
20 TABLET, FILM COATED ORAL DAILY
Qty: 90 TABLET | Refills: 0 | Status: SHIPPED | OUTPATIENT
Start: 2022-11-17 | End: 2022-12-18 | Stop reason: SDUPTHER

## 2022-12-18 DIAGNOSIS — Z79.899 MEDICATION MANAGEMENT: ICD-10-CM

## 2022-12-18 DIAGNOSIS — I47.10 SVT (SUPRAVENTRICULAR TACHYCARDIA): Primary | ICD-10-CM

## 2022-12-18 DIAGNOSIS — I47.10 PSVT (PAROXYSMAL SUPRAVENTRICULAR TACHYCARDIA): ICD-10-CM

## 2022-12-19 RX ORDER — DIGOXIN 250 MCG
250 TABLET ORAL DAILY
Qty: 90 TABLET | Refills: 0 | Status: SHIPPED | OUTPATIENT
Start: 2022-12-19 | End: 2023-03-29 | Stop reason: SDUPTHER

## 2022-12-19 RX ORDER — ATORVASTATIN CALCIUM 20 MG/1
20 TABLET, FILM COATED ORAL DAILY
Qty: 90 TABLET | Refills: 0 | Status: SHIPPED | OUTPATIENT
Start: 2022-12-19 | End: 2023-03-29 | Stop reason: SDUPTHER

## 2022-12-27 ENCOUNTER — OFFICE VISIT (OUTPATIENT)
Dept: CARDIOLOGY | Age: 67
End: 2022-12-27
Attending: INTERNAL MEDICINE

## 2022-12-27 ENCOUNTER — LAB SERVICES (OUTPATIENT)
Dept: LAB | Age: 67
End: 2022-12-27

## 2022-12-27 DIAGNOSIS — Z79.899 MEDICATION MANAGEMENT: ICD-10-CM

## 2022-12-27 DIAGNOSIS — I47.10 SVT (SUPRAVENTRICULAR TACHYCARDIA): ICD-10-CM

## 2022-12-27 DIAGNOSIS — I47.10 PSVT (PAROXYSMAL SUPRAVENTRICULAR TACHYCARDIA): ICD-10-CM

## 2022-12-27 DIAGNOSIS — Z79.899 MEDICATION MANAGEMENT: Primary | ICD-10-CM

## 2022-12-27 LAB — DIGOXIN SERPL-MCNC: 1.4 NG/ML (ref 0.8–2.1)

## 2022-12-27 PROCEDURE — 36415 COLL VENOUS BLD VENIPUNCTURE: CPT | Performed by: INTERNAL MEDICINE

## 2022-12-27 PROCEDURE — 80162 ASSAY OF DIGOXIN TOTAL: CPT | Performed by: CLINICAL MEDICAL LABORATORY

## 2022-12-27 PROCEDURE — 93000 ELECTROCARDIOGRAM COMPLETE: CPT | Performed by: INTERNAL MEDICINE

## 2023-02-09 ENCOUNTER — TELEPHONE (OUTPATIENT)
Dept: CARDIOLOGY | Age: 68
End: 2023-02-09

## 2023-02-10 ENCOUNTER — APPOINTMENT (OUTPATIENT)
Dept: CARDIOLOGY | Age: 68
End: 2023-02-10

## 2023-03-30 RX ORDER — ATORVASTATIN CALCIUM 20 MG/1
20 TABLET, FILM COATED ORAL DAILY
Qty: 90 TABLET | Refills: 1 | Status: SHIPPED | OUTPATIENT
Start: 2023-03-30 | End: 2023-06-29 | Stop reason: SDUPTHER

## 2023-03-30 RX ORDER — DIGOXIN 250 MCG
250 TABLET ORAL DAILY
Qty: 90 TABLET | Refills: 1 | Status: SHIPPED | OUTPATIENT
Start: 2023-03-30 | End: 2023-06-29 | Stop reason: SDUPTHER

## 2023-06-28 PROBLEM — R80.9 DIABETES MELLITUS WITH MICROALBUMINURIA (CMD): Status: ACTIVE | Noted: 2022-10-19

## 2023-06-28 PROBLEM — E11.29 DIABETES MELLITUS WITH MICROALBUMINURIA (CMD): Status: ACTIVE | Noted: 2022-10-19

## 2023-06-28 ASSESSMENT — ENCOUNTER SYMPTOMS
ENDOCRINE NEGATIVE: 1
EYES NEGATIVE: 1
CONSTITUTIONAL NEGATIVE: 1
HEMATOLOGIC/LYMPHATIC NEGATIVE: 1
PSYCHIATRIC NEGATIVE: 1
ALLERGIC/IMMUNOLOGIC NEGATIVE: 1
GASTROINTESTINAL NEGATIVE: 1
RESPIRATORY NEGATIVE: 1
NEUROLOGICAL NEGATIVE: 1

## 2023-06-29 ENCOUNTER — OFFICE VISIT (OUTPATIENT)
Dept: CARDIOLOGY | Age: 68
End: 2023-06-29

## 2023-06-29 ENCOUNTER — TELEPHONE (OUTPATIENT)
Dept: CARDIOLOGY | Age: 68
End: 2023-06-29

## 2023-06-29 ENCOUNTER — E-ADVICE (OUTPATIENT)
Dept: CARDIOLOGY | Age: 68
End: 2023-06-29

## 2023-06-29 VITALS
SYSTOLIC BLOOD PRESSURE: 140 MMHG | RESPIRATION RATE: 18 BRPM | OXYGEN SATURATION: 98 % | DIASTOLIC BLOOD PRESSURE: 70 MMHG | HEIGHT: 68 IN | BODY MASS INDEX: 26.91 KG/M2 | WEIGHT: 177.58 LBS | HEART RATE: 65 BPM

## 2023-06-29 DIAGNOSIS — I47.10 SVT (SUPRAVENTRICULAR TACHYCARDIA): ICD-10-CM

## 2023-06-29 DIAGNOSIS — R80.9 MICROALBUMINURIA: ICD-10-CM

## 2023-06-29 DIAGNOSIS — E11.29 DIABETES MELLITUS WITH MICROALBUMINURIA (CMD): ICD-10-CM

## 2023-06-29 DIAGNOSIS — E11.9 TYPE 2 DIABETES MELLITUS WITHOUT COMPLICATION, WITHOUT LONG-TERM CURRENT USE OF INSULIN (CMD): ICD-10-CM

## 2023-06-29 DIAGNOSIS — R80.9 DIABETES MELLITUS WITH MICROALBUMINURIA (CMD): ICD-10-CM

## 2023-06-29 DIAGNOSIS — E78.2 MIXED HYPERLIPIDEMIA: ICD-10-CM

## 2023-06-29 DIAGNOSIS — I10 ESSENTIAL HYPERTENSION, BENIGN: ICD-10-CM

## 2023-06-29 DIAGNOSIS — I47.10 PSVT (PAROXYSMAL SUPRAVENTRICULAR TACHYCARDIA): Primary | ICD-10-CM

## 2023-06-29 PROCEDURE — 93000 ELECTROCARDIOGRAM COMPLETE: CPT | Performed by: INTERNAL MEDICINE

## 2023-06-29 PROCEDURE — 99214 OFFICE O/P EST MOD 30 MIN: CPT | Performed by: INTERNAL MEDICINE

## 2023-06-29 RX ORDER — DIGOXIN 250 MCG
250 TABLET ORAL DAILY
Qty: 90 TABLET | Refills: 3 | Status: SHIPPED | OUTPATIENT
Start: 2023-06-29

## 2023-06-29 RX ORDER — LISINOPRIL 5 MG/1
5 TABLET ORAL DAILY
Qty: 30 TABLET | Refills: 3 | Status: SHIPPED | OUTPATIENT
Start: 2023-06-29 | End: 2023-10-13 | Stop reason: SDUPTHER

## 2023-06-29 RX ORDER — ATORVASTATIN CALCIUM 20 MG/1
20 TABLET, FILM COATED ORAL DAILY
Qty: 90 TABLET | Refills: 3 | Status: SHIPPED | OUTPATIENT
Start: 2023-06-29

## 2023-06-29 RX ORDER — EMPAGLIFLOZIN 25 MG/1
TABLET, FILM COATED ORAL
COMMUNITY
Start: 2023-05-20

## 2023-06-29 ASSESSMENT — PAIN SCALES - GENERAL: PAINLEVEL: 5

## 2023-07-06 ENCOUNTER — E-ADVICE (OUTPATIENT)
Dept: CARDIOLOGY | Age: 68
End: 2023-07-06

## 2023-10-13 ENCOUNTER — E-ADVICE (OUTPATIENT)
Dept: CARDIOLOGY | Age: 68
End: 2023-10-13

## 2023-10-13 RX ORDER — LISINOPRIL 5 MG/1
5 TABLET ORAL DAILY
Qty: 90 TABLET | Refills: 3 | Status: SHIPPED | OUTPATIENT
Start: 2023-10-13

## 2023-10-19 ENCOUNTER — E-ADVICE (OUTPATIENT)
Dept: CARDIOLOGY | Age: 68
End: 2023-10-19

## 2024-03-29 ENCOUNTER — E-ADVICE (OUTPATIENT)
Dept: CARDIOLOGY | Age: 69
End: 2024-03-29

## 2024-04-29 RX ORDER — ATORVASTATIN CALCIUM 20 MG/1
20 TABLET, FILM COATED ORAL DAILY
Qty: 90 TABLET | Refills: 3 | Status: SHIPPED | OUTPATIENT
Start: 2024-04-29

## 2024-06-06 DIAGNOSIS — I47.10 PSVT (PAROXYSMAL SUPRAVENTRICULAR TACHYCARDIA) (CMD): ICD-10-CM

## 2024-06-06 DIAGNOSIS — E78.2 MIXED HYPERLIPIDEMIA: Primary | ICD-10-CM

## 2024-06-06 DIAGNOSIS — I47.10 SVT (SUPRAVENTRICULAR TACHYCARDIA) (CMD): ICD-10-CM

## 2024-06-07 ENCOUNTER — E-ADVICE (OUTPATIENT)
Dept: CARDIOLOGY | Age: 69
End: 2024-06-07

## 2024-06-07 RX ORDER — DIGOXIN 250 MCG
250 TABLET ORAL DAILY
Qty: 90 TABLET | Refills: 1 | Status: SHIPPED | OUTPATIENT
Start: 2024-06-07

## 2024-06-07 RX ORDER — ATORVASTATIN CALCIUM 20 MG/1
20 TABLET, FILM COATED ORAL DAILY
Qty: 90 TABLET | Refills: 1 | Status: SHIPPED | OUTPATIENT
Start: 2024-06-07

## 2024-07-10 ENCOUNTER — APPOINTMENT (OUTPATIENT)
Dept: CARDIOLOGY | Age: 69
End: 2024-07-10

## 2024-07-11 DIAGNOSIS — I47.10 PSVT (PAROXYSMAL SUPRAVENTRICULAR TACHYCARDIA) (CMD): ICD-10-CM

## 2024-07-11 DIAGNOSIS — I47.10 SVT (SUPRAVENTRICULAR TACHYCARDIA) (CMD): ICD-10-CM

## 2024-07-11 DIAGNOSIS — E78.2 MIXED HYPERLIPIDEMIA: ICD-10-CM

## 2024-07-16 RX ORDER — LISINOPRIL 5 MG/1
5 TABLET ORAL DAILY
Qty: 90 TABLET | Refills: 3 | Status: SHIPPED | OUTPATIENT
Start: 2024-07-16

## 2024-07-16 RX ORDER — ATORVASTATIN CALCIUM 20 MG/1
20 TABLET, FILM COATED ORAL DAILY
Qty: 90 TABLET | Refills: 3 | Status: SHIPPED | OUTPATIENT
Start: 2024-07-16

## 2024-07-19 ENCOUNTER — LAB SERVICES (OUTPATIENT)
Dept: LAB | Age: 69
End: 2024-07-19

## 2024-07-19 ENCOUNTER — OFFICE VISIT (OUTPATIENT)
Dept: CARDIOLOGY | Age: 69
End: 2024-07-19

## 2024-07-19 VITALS
WEIGHT: 170 LBS | OXYGEN SATURATION: 99 % | BODY MASS INDEX: 25.76 KG/M2 | RESPIRATION RATE: 18 BRPM | SYSTOLIC BLOOD PRESSURE: 128 MMHG | HEART RATE: 80 BPM | DIASTOLIC BLOOD PRESSURE: 68 MMHG | HEIGHT: 68 IN

## 2024-07-19 DIAGNOSIS — I47.10 SVT (SUPRAVENTRICULAR TACHYCARDIA) (CMD): ICD-10-CM

## 2024-07-19 DIAGNOSIS — E11.9 TYPE 2 DIABETES MELLITUS WITHOUT COMPLICATION, WITHOUT LONG-TERM CURRENT USE OF INSULIN  (CMD): ICD-10-CM

## 2024-07-19 DIAGNOSIS — E78.2 MIXED HYPERLIPIDEMIA: ICD-10-CM

## 2024-07-19 DIAGNOSIS — I10 ESSENTIAL HYPERTENSION, BENIGN: ICD-10-CM

## 2024-07-19 DIAGNOSIS — I47.10 PSVT (PAROXYSMAL SUPRAVENTRICULAR TACHYCARDIA) (CMD): Primary | ICD-10-CM

## 2024-07-19 DIAGNOSIS — Z87.891 FORMER TOBACCO USE: ICD-10-CM

## 2024-07-19 ASSESSMENT — ENCOUNTER SYMPTOMS
PSYCHIATRIC NEGATIVE: 1
ALLERGIC/IMMUNOLOGIC NEGATIVE: 1
RESPIRATORY NEGATIVE: 1
ENDOCRINE NEGATIVE: 1
EYES NEGATIVE: 1
GASTROINTESTINAL NEGATIVE: 1
NEUROLOGICAL NEGATIVE: 1
HEMATOLOGIC/LYMPHATIC NEGATIVE: 1
CONSTITUTIONAL NEGATIVE: 1

## 2024-11-19 ENCOUNTER — APPOINTMENT (OUTPATIENT)
Dept: PULMONOLOGY | Age: 69
End: 2024-11-19

## 2024-12-02 ENCOUNTER — E-ADVICE (OUTPATIENT)
Dept: CARDIOLOGY | Age: 69
End: 2024-12-02

## 2024-12-02 DIAGNOSIS — E78.2 MIXED HYPERLIPIDEMIA: ICD-10-CM

## 2024-12-02 DIAGNOSIS — I47.10 SVT (SUPRAVENTRICULAR TACHYCARDIA) (CMD): ICD-10-CM

## 2024-12-02 DIAGNOSIS — I47.10 PSVT (PAROXYSMAL SUPRAVENTRICULAR TACHYCARDIA) (CMD): ICD-10-CM

## 2024-12-02 RX ORDER — METOPROLOL TARTRATE 25 MG/1
25 TABLET, FILM COATED ORAL EVERY 12 HOURS SCHEDULED
Qty: 180 TABLET | Refills: 3 | Status: SHIPPED | OUTPATIENT
Start: 2024-12-02

## 2024-12-02 RX ORDER — DIGOXIN 250 MCG
250 TABLET ORAL DAILY
Qty: 90 TABLET | Refills: 1 | Status: CANCELLED | OUTPATIENT
Start: 2024-12-02

## 2024-12-02 RX ORDER — DIGOXIN 250 MCG
250 TABLET ORAL DAILY
Qty: 30 TABLET | Refills: 0 | Status: SHIPPED | OUTPATIENT
Start: 2024-12-02 | End: 2025-01-01

## 2024-12-31 ENCOUNTER — E-ADVICE (OUTPATIENT)
Dept: PULMONOLOGY | Age: 69
End: 2024-12-31

## 2025-01-07 ENCOUNTER — APPOINTMENT (OUTPATIENT)
Dept: PULMONOLOGY | Age: 70
End: 2025-01-07

## 2025-01-18 ENCOUNTER — LAB SERVICES (OUTPATIENT)
Dept: LAB | Age: 70
End: 2025-01-18

## 2025-01-21 ENCOUNTER — LAB SERVICES (OUTPATIENT)
Dept: LAB | Age: 70
End: 2025-01-21

## 2025-01-21 DIAGNOSIS — E78.2 MIXED HYPERLIPIDEMIA: ICD-10-CM

## 2025-01-21 DIAGNOSIS — I47.10 PSVT (PAROXYSMAL SUPRAVENTRICULAR TACHYCARDIA) (CMD): ICD-10-CM

## 2025-01-21 DIAGNOSIS — I47.10 SVT (SUPRAVENTRICULAR TACHYCARDIA) (CMD): ICD-10-CM

## 2025-01-21 LAB
ANION GAP SERPL CALC-SCNC: 15 MMOL/L (ref 7–19)
BASOPHILS # BLD: 0 K/MCL (ref 0–0.3)
BASOPHILS NFR BLD: 0 %
BUN SERPL-MCNC: 19 MG/DL (ref 6–20)
BUN/CREAT SERPL: 28 (ref 7–25)
CALCIUM SERPL-MCNC: 9.5 MG/DL (ref 8.4–10.2)
CHLORIDE SERPL-SCNC: 100 MMOL/L (ref 97–110)
CO2 SERPL-SCNC: 28 MMOL/L (ref 21–32)
CREAT SERPL-MCNC: 0.69 MG/DL (ref 0.67–1.17)
DEPRECATED RDW RBC: 44 FL (ref 39–50)
DIGOXIN SERPL-MCNC: 0.6 NG/ML (ref 0.8–2.1)
EGFRCR SERPLBLD CKD-EPI 2021: >90 ML/MIN/{1.73_M2}
EOSINOPHIL # BLD: 0.5 K/MCL (ref 0–0.5)
EOSINOPHIL NFR BLD: 4 %
ERYTHROCYTE [DISTWIDTH] IN BLOOD: 13.5 % (ref 11–15)
FASTING DURATION TIME PATIENT: ABNORMAL H
GLUCOSE SERPL-MCNC: 127 MG/DL (ref 70–99)
HCT VFR BLD CALC: 49.7 % (ref 39–51)
HGB BLD-MCNC: 15.9 G/DL (ref 13–17)
IMM GRANULOCYTES # BLD AUTO: 0 K/MCL (ref 0–0.2)
IMM GRANULOCYTES # BLD: 0 %
LYMPHOCYTES # BLD: 1.5 K/MCL (ref 1–4)
LYMPHOCYTES NFR BLD: 13 %
MCH RBC QN AUTO: 28.2 PG (ref 26–34)
MCHC RBC AUTO-ENTMCNC: 32 G/DL (ref 32–36.5)
MCV RBC AUTO: 88.1 FL (ref 78–100)
MONOCYTES # BLD: 0.9 K/MCL (ref 0.3–0.9)
MONOCYTES NFR BLD: 8 %
NEUTROPHILS # BLD: 8.8 K/MCL (ref 1.8–7.7)
NEUTROPHILS NFR BLD: 75 %
PLATELET # BLD AUTO: 272 K/MCL (ref 140–450)
POTASSIUM SERPL-SCNC: 5.2 MMOL/L (ref 3.4–5.1)
RBC # BLD: 5.64 MIL/MCL (ref 4.5–5.9)
SODIUM SERPL-SCNC: 138 MMOL/L (ref 135–145)
WBC # BLD: 11.7 K/MCL (ref 4.2–11)

## 2025-01-21 PROCEDURE — 85025 COMPLETE CBC W/AUTO DIFF WBC: CPT | Performed by: INTERNAL MEDICINE

## 2025-01-21 PROCEDURE — 80162 ASSAY OF DIGOXIN TOTAL: CPT | Performed by: CLINICAL MEDICAL LABORATORY

## 2025-01-21 PROCEDURE — 36415 COLL VENOUS BLD VENIPUNCTURE: CPT | Performed by: INTERNAL MEDICINE

## 2025-01-21 PROCEDURE — 80048 BASIC METABOLIC PNL TOTAL CA: CPT | Performed by: INTERNAL MEDICINE

## 2025-01-22 DIAGNOSIS — I10 ESSENTIAL HYPERTENSION, BENIGN: ICD-10-CM

## 2025-01-22 DIAGNOSIS — E87.5 HYPERKALEMIA: ICD-10-CM

## 2025-01-22 DIAGNOSIS — Z79.899 ENCOUNTER FOR MONITORING DIGOXIN THERAPY: ICD-10-CM

## 2025-01-22 DIAGNOSIS — Z51.81 ENCOUNTER FOR MONITORING DIGOXIN THERAPY: ICD-10-CM

## 2025-01-22 DIAGNOSIS — E78.2 MIXED HYPERLIPIDEMIA: ICD-10-CM

## 2025-01-22 DIAGNOSIS — I47.10 SVT (SUPRAVENTRICULAR TACHYCARDIA) (CMD): Primary | ICD-10-CM

## 2025-01-22 RX ORDER — DIGOXIN 250 MCG
250 TABLET ORAL DAILY
Qty: 90 TABLET | Refills: 1 | Status: SHIPPED | OUTPATIENT
Start: 2025-01-22 | End: 2025-07-21

## 2025-01-27 ENCOUNTER — APPOINTMENT (OUTPATIENT)
Dept: PULMONOLOGY | Age: 70
End: 2025-01-27

## 2025-02-05 ENCOUNTER — TELEPHONE (OUTPATIENT)
Dept: ORTHOPEDICS CLINIC | Facility: CLINIC | Age: 70
End: 2025-02-05

## 2025-02-05 DIAGNOSIS — M54.50 LOW BACK PAIN, UNSPECIFIED BACK PAIN LATERALITY, UNSPECIFIED CHRONICITY, UNSPECIFIED WHETHER SCIATICA PRESENT: Primary | ICD-10-CM

## 2025-02-05 NOTE — TELEPHONE ENCOUNTER
Patient is scheduled for lower back pain. No xrays in Epic. Previous patient of Dr. Rivera, and had surgery about 5/6 years ago with him. Please advise if imaging is needed.  Future Appointments   Date Time Provider Department Center   2/6/2025  9:00 AM [id number]

## 2025-02-05 NOTE — TELEPHONE ENCOUNTER
Lumbar xray ordered and scheduled.     Your appointments       Date & Time Appointment Department ([place])    Feb 06, 2025 8:30 AM CST XRAY LUMBAR SPINE with WDR XR RM1 [facility] X-ray [facility] ([facility])        Feb 06, 2025 9:00 AM CST Exam - Established with Carl Keyes PA [address] ([place] [address])              [facility] X-ray [address] [address]  [phone] [address] [address]  [phone]

## 2025-02-06 ENCOUNTER — HOSPITAL ENCOUNTER (OUTPATIENT)
Dept: GENERAL RADIOLOGY | Age: 70
Discharge: HOME | End: 2025-02-06
Attending: PHYSICIAN ASSISTANT
Payer: MEDICARE

## 2025-02-06 ENCOUNTER — HOSPITAL ENCOUNTER (OUTPATIENT)
Dept: MRI IMAGING | Age: 70
Discharge: HOME | End: 2025-02-06
Attending: PHYSICIAN ASSISTANT
Payer: MEDICARE

## 2025-02-06 ENCOUNTER — PATIENT MESSAGE (OUTPATIENT)
Dept: ORTHOPEDICS CLINIC | Facility: CLINIC | Age: 70
End: 2025-02-06

## 2025-02-06 ENCOUNTER — OFFICE VISIT (OUTPATIENT)
Dept: ORTHOPEDICS CLINIC | Facility: CLINIC | Age: 70
End: 2025-02-06
Payer: MEDICARE

## 2025-02-06 VITALS — HEIGHT: 69 IN | WEIGHT: 172 LBS | BODY MASS INDEX: 25.48 KG/M2

## 2025-02-06 DIAGNOSIS — R29.898 WEAKNESS OF BOTH LOWER EXTREMITIES: ICD-10-CM

## 2025-02-06 DIAGNOSIS — R20.0 LOWER EXTREMITY NUMBNESS: ICD-10-CM

## 2025-02-06 DIAGNOSIS — M47.816 LUMBAR SPONDYLOSIS: Primary | ICD-10-CM

## 2025-02-06 DIAGNOSIS — M48.062 SPINAL STENOSIS OF LUMBAR REGION WITH NEUROGENIC CLAUDICATION: ICD-10-CM

## 2025-02-06 DIAGNOSIS — G62.9 NEUROPATHY: ICD-10-CM

## 2025-02-06 DIAGNOSIS — M47.816 LUMBAR SPONDYLOSIS: ICD-10-CM

## 2025-02-06 DIAGNOSIS — M54.50 LOW BACK PAIN, UNSPECIFIED BACK PAIN LATERALITY, UNSPECIFIED CHRONICITY, UNSPECIFIED WHETHER SCIATICA PRESENT: ICD-10-CM

## 2025-02-06 PROCEDURE — 72110 X-RAY EXAM L-2 SPINE 4/>VWS: CPT | Performed by: PHYSICIAN ASSISTANT

## 2025-02-06 PROCEDURE — 72148 MRI LUMBAR SPINE W/O DYE: CPT | Performed by: PHYSICIAN ASSISTANT

## 2025-02-06 PROCEDURE — 99213 OFFICE O/P EST LOW 20 MIN: CPT | Performed by: PHYSICIAN ASSISTANT

## 2025-02-06 NOTE — PROGRESS NOTES
Patient: Ananda Perez  Medical Record Number: [id number]  Site: [place]  Referring Physician:  No ref. provider found  PCP: Maria Luisa Hannah MD        HISTORY OF CHIEF COMPLAINT:    Ananda Perez is a 69 year old male, who complains of many year h/o bilateral radiating LBP.  Denies saddle anesthesia or incontinence.  Patient has a long history of low back pain with pain that shoots down bilateral lateral thighs to his feet.  He has numbness and tingling from his shins down to his feet.  He had an EMG in the past which showed neuropathy.  He has seen Dr. Rivera in the past for this and they discussed surgery versus injections.  He has not had injections done.  He was asked to see a neurologist regarding his neuropathy but he has not done that.  He states that his pain down his legs is getting worse.  He is concerned about the numbness worsening.  He is very limited with walking.  We have done an ACDF on him in the past for myelopathy.    VAS Pain Score: 4 /10      Past Medical History:    Back problem    cervical spine    Fatty liver    High blood pressure    High cholesterol    OTHER DISEASES    tachycardia    Psoriasis    Worse in the winter    PSVT (paroxysmal supraventricular tachycardia) (HCC)    Sleep apnea    AHI 45 RDI 47 REM AHI 0 Supine AHI 45 non-supine AHI 0.0 Sao2 Manjinder 90%    Visual impairment    reading glasses      Past Surgical History:   Procedure Laterality Date    Back surgery  04/30/2018    C3-C6 ACDF     Cardiac catheterization  1997    No coronary lesions      Family History   Problem Relation Age of Onset    Hypertension Father     Heart Disorder Father     Hypertension Mother     Heart Disorder Mother     Other (HOLLAND) Brother       Social History     Socioeconomic History    Marital status:     Number of children: 1   Occupational History    Occupation: [employer]     Employer: [employer]   Tobacco Use    Smoking status: Former     Current packs/day: 0.00     Types:  Cigarettes     Quit date: 1981     Years since quittin.1    Smokeless tobacco: Former   Vaping Use    Vaping status: Never Used   Substance and Sexual Activity    Alcohol use: Yes     Comment: social    Drug use: No   Other Topics Concern    Exercise No    Seat Belt Yes      Current Medications:  Current Outpatient Medications   Medication Sig Dispense Refill    metFORMIN 500 MG Oral Tab Take 1 tablet (500 mg total) by mouth 2 (two) times daily with meals. 360 tablet 0    Microlet Lancets Does not apply Misc Check BG  2 times daily 100 each 6    atorvastatin 20 MG Oral Tab Take 1 tablet (20 mg total) by mouth daily.      Glucose Blood (CONTOUR NEXT TEST) In Vitro Strip Test BID dx E11.9 180 each 3    Blood Glucose Monitoring Suppl (CONTOUR NEXT MONITOR) w/Device Does not apply Kit 1 each by Does not apply route 2 (two) times a day. dx E11.9 1 kit 0    Microlet Lancets Does not apply Misc Test BID dx E11.9 180 each 3    EZETIMIBE 10 MG Oral Tab TAKE 1 TABLET BY MOUTH DAILY 90 tablet 0    metoprolol Tartrate 25 MG Oral Tab Take 1 tablet (25 mg total) by mouth 2 (two) times daily. 180 tablet 3    Levocetirizine Dihydrochloride 5 MG Oral Tab Take 1 tablet (5 mg total) by mouth daily.      Turmeric 500 MG Oral Cap Take 1 capsule by mouth daily.      omega-3 fatty acids (FISH OIL) 1000 MG Oral Cap Take 1,000 mg by mouth daily.      digoxin 0.25 MG Oral Tab Take 1 tablet (0.25 mg total) by mouth daily. 90 tablet 3      .        IMAGING STUDIES:  X-rays were reviewed with the patient today  X-rays  EXAM: X-RAY OF LUMBAR SPINE     CLINICAL INFORMATION: Back pain     FINDINGS:     AP, Lateral with flexion/extension views of lumbar spine completed.   5 lumbar vertebral bodies seen.   Spondylosis is seen with disc space loss and sclerotic endplate changes and facet joint hypertrophy noted throughout the entire lumbar spine  No spondylolysis   N spondylolisthesis.  No fractures   No destructive lesions seen.       IMPRESSION:    Lumbar spondylosis as detailed above          PHYSICAL EXAMIMATION   PHYSICAL EXAMINATION: Ananda Perez is a 69 year old   male who is observed sitting comfortably in the exam room alert and oriented times three. RESPIRATORY RATE: 18 He looks consistent his stated age.    Ht 5' 9\" (1.753 m)   Wt 172 lb (78 kg)   BMI 25.40 kg/m²   The patient is well developed, well nourished, thin body habitus, well muscled.       Inspection: No acute distress.   Patient displays antalgic gait, and is unable to normal heel walk, normal toe walk.       ROM:  Forward Flexion  Pain free    Extension  Pain     Palpation:  See chart below:  Palpation of Lumbar Area Right   (POS or NEG) Left   (POS or NEG)   Lumbar paraspinals Neg Neg   SIJ Neg Neg   PSIS Neg Neg   Trochanteric Bursa Neg Neg     Strength:      DTR's:     Left Right    Left Right    EHL 5/5 5/5  Patella  2+/4 2+/4    DF 5/5 5/5  Achilles  2+/4 2+/4    PF 4-/5 4-+/5    Quad 5/5 5/5    IP 5/5 5/5    Sensation:  Sensory deficits noted on bilateral lower extremities to light touch: none    Tests:  Test Right   (POS or NEG) Left   (POS or NEG)   SLR Neg Neg   Clonus Neg Neg      Calves supple, NT   MUSCULOSKELETAL: Fluid, pain-free ROM to bilateral: ankles, knees  & hips                                                                                     MEDICAL DECISION MAKING:   Impression: Lumbar Spine:  Lumbar Spondylosis 721.3   Lumbar Spinal Stenosis 724.02     Plan: We discussed the diagnosis and treatment options today, including rationale for surgical vs non-surgical options.  The patient now has weakness in bilateral feet.  He is unable to go up on his toes.  He is having progression of his pain and his numbness in bilateral legs.  He will update his MRI and see Dr. Rivera.  I would like him to go to physical therapy for balance training.  His concerns were addressed.  We did discuss a decompression surgery today.  Images were reviewed  and discussed with the patient.  They voiced understanding of what the images showed.  Restrictions and limitations were reviewed with the patient.  Concerns were addressed.  Questions were encouraged and answered.  Patient voiced understanding.      The patient indicates understanding of these issues and agrees to the plan.    Thank you very much.     Respectfully yours,    Carl Keyes PA-C    This note was dictated using Dragon software. While it was briefly proofread prior to completion, some grammatical, spelling, and word choice errors due to dictation may still occur.

## 2025-02-07 NOTE — TELEPHONE ENCOUNTER
Called and spoke w/ patient.   Scheduled patient for f/u appointment w/ PA-C to review MRI lumbar.   Appt date/time/location provided to patient. Patient agreed.

## 2025-02-13 ENCOUNTER — OFFICE VISIT (OUTPATIENT)
Dept: ORTHOPEDICS CLINIC | Facility: CLINIC | Age: 70
End: 2025-02-13
Payer: MEDICARE

## 2025-02-13 VITALS — HEIGHT: 69 IN | BODY MASS INDEX: 25.48 KG/M2 | WEIGHT: 172 LBS

## 2025-02-13 DIAGNOSIS — M47.816 LUMBAR SPONDYLOSIS: Primary | ICD-10-CM

## 2025-02-13 DIAGNOSIS — M48.062 SPINAL STENOSIS OF LUMBAR REGION WITH NEUROGENIC CLAUDICATION: ICD-10-CM

## 2025-02-13 PROCEDURE — 99213 OFFICE O/P EST LOW 20 MIN: CPT | Performed by: PHYSICIAN ASSISTANT

## 2025-02-13 NOTE — PROGRESS NOTES
Patient: Ananda Perez  Medical Record Number: [id number]  Site: [place]  Referring Physician:  No ref. provider found  PCP: Maria Luisa Hannah MD        HISTORY OF CHIEF COMPLAINT:    Ananda Perez is a 69 year old male, who complains of many year h/o bilateral radiating LBP.  Denies saddle anesthesia or incontinence.  Patient has a long history of low back pain with pain that shoots down bilateral lateral thighs to his feet.  He has numbness and tingling from his shins down to his feet.  He had an EMG in the past which showed neuropathy.  He has seen Dr. Rivera in the past for this and they discussed surgery versus injections.  He has not had injections done.  He was asked to see a neurologist regarding his neuropathy but he has not done that.  He states that his pain down his legs is getting worse.  He is concerned about the numbness worsening.  He is very limited with walking.  We have done an ACDF on him in the past for myelopathy.  He is concerned because he is progressively getting weaker.  He is here for MRI results.     VAS Pain Score: 4 /10      Past Medical History:    Back problem    cervical spine    Fatty liver    High blood pressure    High cholesterol    OTHER DISEASES    tachycardia    Psoriasis    Worse in the winter    PSVT (paroxysmal supraventricular tachycardia) (HCC)    Sleep apnea    AHI 45 RDI 47 REM AHI 0 Supine AHI 45 non-supine AHI 0.0 Sao2 Manjinder 90%    Visual impairment    reading glasses      Past Surgical History:   Procedure Laterality Date    Back surgery  04/30/2018    C3-C6 ACDF     Cardiac catheterization  1997    No coronary lesions      Family History   Problem Relation Age of Onset    Hypertension Father     Heart Disorder Father     Hypertension Mother     Heart Disorder Mother     Other (HOLLAND) Brother       Social History     Socioeconomic History    Marital status:     Number of children: 1   Occupational History    Occupation: [employer]     Employer: [employer]   Tobacco Use    Smoking status: Former     Current packs/day: 0.00     Types: Cigarettes     Quit date: 1981     Years since quittin.1    Smokeless tobacco: Former   Vaping Use    Vaping status: Never Used   Substance and Sexual Activity    Alcohol use: Yes     Comment: social    Drug use: No   Other Topics Concern    Exercise No    Seat Belt Yes      Current Medications:  Current Outpatient Medications   Medication Sig Dispense Refill    metFORMIN 500 MG Oral Tab Take 1 tablet (500 mg total) by mouth 2 (two) times daily with meals. 360 tablet 0    Microlet Lancets Does not apply Misc Check BG  2 times daily 100 each 6    atorvastatin 20 MG Oral Tab Take 1 tablet (20 mg total) by mouth daily.      Glucose Blood (CONTOUR NEXT TEST) In Vitro Strip Test BID dx E11.9 180 each 3    Blood Glucose Monitoring Suppl (CONTOUR NEXT MONITOR) w/Device Does not apply Kit 1 each by Does not apply route 2 (two) times a day. dx E11.9 1 kit 0    Microlet Lancets Does not apply Misc Test BID dx E11.9 180 each 3    EZETIMIBE 10 MG Oral Tab TAKE 1 TABLET BY MOUTH DAILY 90 tablet 0    metoprolol Tartrate 25 MG Oral Tab Take 1 tablet (25 mg total) by mouth 2 (two) times daily. 180 tablet 3    digoxin 0.25 MG Oral Tab Take 1 tablet (0.25 mg total) by mouth daily. 90 tablet 3    Levocetirizine Dihydrochloride 5 MG Oral Tab Take 1 tablet (5 mg total) by mouth daily.      Turmeric 500 MG Oral Cap Take 1 capsule by mouth daily.      omega-3 fatty acids (FISH OIL) 1000 MG Oral Cap Take 1,000 mg by mouth daily.        .        IMAGING STUDIES:  X-rays were reviewed with the patient today  X-rays  EXAM: X-RAY OF LUMBAR SPINE     CLINICAL INFORMATION: Back pain     FINDINGS:     AP, Lateral with flexion/extension views of lumbar spine completed.   5 lumbar vertebral bodies seen.   Spondylosis is seen with disc space loss and sclerotic endplate changes and facet joint hypertrophy noted throughout the entire lumbar spine  No  spondylolysis   N spondylolisthesis.  No fractures   No destructive lesions seen.      IMPRESSION:    Lumbar spondylosis as detailed above    MRI SPINE LUMBAR (CPT=72148)    Result Date: 2/6/2025  PROCEDURE:  MRI SPINE LUMBAR (CPT=72148)  COMPARISON:  [facility], XR LUMBAR SPINE (MIN 4 VIEWS) (CPT=72110), 2/06/2025, 9:06 AM.  INDICATIONS:  R29.898 Weakness of both lower extremities M48.062 Spinal stenosis of lumbar region with neurogenic claudication R20.0 Lower extremity numbness M47.816 Lumbar *  TECHNIQUE:  Multiplanar T1 and T2 weighted images including fat suppression sequences.  Images acquired in sagittal and axial planes.   PATIENT STATED HISTORY: (As transcribed by Technologist)  Patient complains of chronic lower back pain and bilateral lower extremity numbness and weakness.    FINDINGS:  LUMBAR DISC LEVELS L1-L2:  There is extensive degenerative disc disease with extensive disc desiccation and disc height loss.  There is a broad-based disc bulge within a superimposed large left paracentral disc protrusion that measures 0.8 x 0.9 cm in AP and transverse dimensions respectively.  There is moderate bilateral facet arthropathy with hypertrophy and ligamentum flavum thickening.  There is secondary moderate central canal stenosis, severe left axillary recess stenosis, severe bilateral subarticular stenosis and mild to moderate bilateral neural foraminal stenosis. L2-L3:  There is extensive disc desiccation and disc height loss with a broad-based disc bulge/osteophyte complex.  There is moderate bilateral facet arthropathy with hypertrophy and ligamentum flavum thickening.  There is moderate central canal stenosis, severe bilateral subarticular stenosis and moderate bilateral neural foraminal stenosis. L3-L4:  There is extensive disc desiccation, severe disc space loss with a pronounced broad-based disc bulge/osteophyte complex.  There is moderate bilateral facet arthropathy and ligamentum flavum thickening.   There is moderate to severe central canal stenosis, severe bilateral subarticular stenosis and moderate bilateral neural foraminal stenosis. L4-L5:  There is severe disc space loss, severe disc desiccation with a broad-based disc bulge/osteophyte complex.  There is moderate bilateral facet arthropathy with hypertrophy and ligamentum flavum thickening.  There is mild to moderate central canal stenosis, severe bilateral subarticular stenosis and moderate bilateral neural foraminal stenosis. L5-S1:  There is severe disc disease with severe disc space loss and broad-based disc bulge/osteophyte complex.  There is severe bilateral facet arthropathy with hypertrophy and ligamentum a full Lava thickening.  There is mild to moderate central canal stenosis, severe bilateral subarticular stenosis and moderate to severe bilateral neural foraminal stenosis.  PARASPINAL AREA:  No suspicious mass lesions.  Multiple parapelvic cysts of the kidneys suggested, more pronounced on the right relative to the left, the largest parapelvic cyst on the right seen within the midpole region measuring 1.5 cm. BONY STRUCTURES:  There is straightening of the normal lumbar lordosis.  No acute osseous injuries/fractures are noted.  No lytic, blastic or destructive osseous changes are noted.  There is multilevel moderate to severe facet osteoarthritis. CORD/CAUDA EQUINA:  The distal cord and conus appear within normal limits.  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            CONCLUSION:  1. No acute process. 2. There is moderate to severe multilevel degenerative disc disease and facet arthropathy, with significant multilevel spinal canal stenosis, subarticular and neural foraminal stenosis throughout the entirety of the lumbar spine. 3. 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 4. Please see further details above.   LOCATION:  [facility]   Dictated by (CST): Vimal Drummond DO on 2/06/2025 at 8:54 PM     Finalized by (CST): Vimal Drummond DO on 2/06/2025 at 9:04 PM           PHYSICAL EXAMIMATION   PHYSICAL EXAMINATION: Ananda Perez is a 69 year old   male who is observed sitting comfortably in the exam room alert and oriented times three. RESPIRATORY RATE: 18 He looks consistent his stated age.    Ht 5' 9\" (1.753 m)   Wt 172 lb (78 kg)   BMI 25.40 kg/m²   The patient is well developed, well nourished, thin body habitus, well muscled.       Inspection: No acute distress.   Patient displays antalgic gait, and is unable to normal heel walk, normal toe walk.       ROM:  Forward Flexion  Pain free    Extension  Pain     Palpation:  See chart below:  Palpation of Lumbar Area Right   (POS or NEG) Left   (POS or NEG)   Lumbar paraspinals Neg Neg   SIJ Neg Neg   PSIS Neg Neg   Trochanteric Bursa Neg Neg     Strength:      DTR's:     Left Right    Left Right    EHL 5/5 5/5  Patella  2+/4 2+/4    DF 5/5 5/5  Achilles  2+/4 2+/4    PF 4-/5 4-+/5    Quad 5/5 5/5    IP 5/5 5/5    Sensation:  Sensory deficits noted on bilateral lower extremities to light touch: none    Tests:  Test Right   (POS or NEG) Left   (POS or NEG)   SLR Neg Neg   Clonus Neg Neg      Calves supple, NT   MUSCULOSKELETAL: Fluid, pain-free ROM to bilateral: ankles, knees  & hips                                                                                     MEDICAL DECISION MAKING:   Impression: Lumbar Spine:  Lumbar Spondylosis 721.3   Lumbar Spinal Stenosis 724.02     Plan: We discussed the diagnosis and treatment options today, including rationale for surgical vs non-surgical options.  We had a long discussion about the MRI results and reviewed all the imaging.  He does have significant multilevel stenosis.  I do think that some of his symptoms  are coming from this.  Also, I think some of his numbness is from his Diabetic neuropathy.  I would like him to see Dr. Rivera.  I did discuss both a decompression vs a decompression and interbody fusion with him today.  I did speak to his daughter on the phone.  Their concerns were addressed.    Images were reviewed and discussed with the patient.  They voiced understanding of what the images showed.  Restrictions and limitations were reviewed with the patient.  Concerns were addressed.  Questions were encouraged and answered.  Patient voiced understanding.      The patient indicates understanding of these issues and agrees to the plan.    Thank you very much.     Respectfully yours,    Carl Keyes PA-C    This note was dictated using Dragon software. While it was briefly proofread prior to completion, some grammatical, spelling, and word choice errors due to dictation may still occur.

## 2025-02-17 ENCOUNTER — TELEPHONE (OUTPATIENT)
Facility: CLINIC | Age: 70
End: 2025-02-17

## 2025-02-17 NOTE — TELEPHONE ENCOUNTER
Pt says to please call him back after 3pm for video visit with Dr Melissa. Pt contact [phone]  Future Appointments  4/8/2025   3:00 PM    Esteban Galeas DO [id number]

## 2025-02-18 ENCOUNTER — TELEPHONE (OUTPATIENT)
Facility: CLINIC | Age: 70
End: 2025-02-18

## 2025-02-18 NOTE — TELEPHONE ENCOUNTER
Sola from [facility] is requesting the pt scripts for physical therapy Fax [phone] Sola contact  [phone]   Future Appointments  2/19/2025  9:45 AM    EMILIANO Rivera MD [facility]            [facility]  4/8/2025   3:00 PM    Esteban Galeas DO [facility]        [id number]

## 2025-02-19 ENCOUNTER — TELEMEDICINE (OUTPATIENT)
Dept: SURGERY | Facility: CLINIC | Age: 70
End: 2025-02-19
Payer: MEDICARE

## 2025-02-19 DIAGNOSIS — M48.062 SPINAL STENOSIS OF LUMBAR REGION WITH NEUROGENIC CLAUDICATION: ICD-10-CM

## 2025-02-19 DIAGNOSIS — M51.26 HNP (HERNIATED NUCLEUS PULPOSUS), LUMBAR: Primary | ICD-10-CM

## 2025-02-19 NOTE — PROGRESS NOTES
[facility] - SPINE SURGERY  [phone]     SPINE PROGRESS NOTE         The following individual(s) verbally consented to be recorded using ambient AI listening technology and understand that they can each withdraw their consent to this listening technology at any point by asking the clinician to turn off or pause the recording:    Patient name: Ananda Perez  Additional names:          Name: Ananda Perez   MRN: [id number]  Date: 2/19/2025     CC: No chief complaint on file.       HPI:   History of Present Illness  Ananda Perez is a 69 year old male with lumbar spinal stenosis who presents with worsening lower back pain and numbness in the legs and feet. He is accompanied by his daughter.    He experiences worsening lower back pain and numbness in his legs and feet, with the numbness extending beyond his knees and affecting both the tops and bottoms of his feet. He has a history of neuropathy in his toes, which has recently worsened. He has not undergone any injections for his condition due to fear of paralysis.    He has a history of neck pain, treated a few years ago, but continues to experience pain in the back of his neck and numbness in his fingers. At times, his neck pain is more bothersome than his back pain.    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 No tripping or falling is reported, but there is a decrease in strength, particularly in his toes, which was previously noted by another provider.      PE:   He does relate that he is having some trouble heel and toe walking          Radiographic Examination/Diagnostics:  I personally viewed, independently interpreted and radiology report was reviewed.  Results  RADIOLOGY  Lumbar spine MRI: Stenosis at L1-L2, L2-L3, L3-L4, L4-L5, and L5-S1        IMPRESSION: Ananda Perez is a 69 year old male   Assessment & Plan  Lumbar Spinal Stenosis     Chronic lumbar spinal stenosis from L1 to S1 is causing leg and foot numbness and pain. He is now willing to try injections, understanding the rare risk of paralysis. Injections are recommended before considering surgery. If effective, injections may be repeated up to three times a year. If ineffective, surgical options will be discussed. Set up a lumbar epidural steroid injection and follow up after the first injection to assess efficacy.    Cervical Spinal Stenosis    Chronic cervical spinal stenosis is causing neck pain and finger numbness. Symptoms persist. Monitor symptoms and reassess if there is any worsening or new symptoms.    Peripheral Neuropathy    Peripheral neuropathy is causing worsening numbness in toes and feet, potentially related to lumbar spinal stenosis. Improvement with lumbar injections would indicate a spinal etiology; otherwise, neuropathy may be a separate issue. Evaluate response to lumbar epidural steroid injection to differentiate between neuropathy and symptoms related to spinal stenosis.    Follow-up    Follow up after the first injection. Discuss potential surgical options if injections are ineffective.        ICD-10-CM    1. HNP (herniated nucleus pulposus), lumbar  M51.26 Pain Management Referral - In Network      2. Spinal stenosis of lumbar region with neurogenic claudication  M48.062 Pain Management Referral - In Network           PLAN: Ananda and LALA went over imaging, prior treatments and arranged for a plan that incorporated personal goals, social circumstances and any current medical challenges.      After thorough discussion Ananda will move forward with injections and further non-operative care.    Follow-up after his injection  Greater than 30 minutes of total time was required in the care of the patient today.  Note to patient: The 21st Century Cures Act makes medical notes like these available to patients in the interest of transparency. However, be advised this is a  medical document. It is intended primarily as peer to peer communication, is written in medical language, and may contain abbreviations or verbiage that are unfamiliar. This document is intended to carry relevant information, facts as evident, and the clinical opinion of the practitioner at the time of writing.     VIRGINIA Rivera MD  Orthopedic Spine Surgeon  [facility]   [facility].[employer]  t: [phone]  f: [phone]        This note was dictated using Dragon software.  While it was briefly proofread prior to completion, some grammatical, spelling, and word choice errors due to dictation may still occur.

## 2025-02-20 ENCOUNTER — PATIENT MESSAGE (OUTPATIENT)
Dept: ORTHOPEDICS CLINIC | Facility: CLINIC | Age: 70
End: 2025-02-20

## 2025-02-20 ENCOUNTER — TELEPHONE (OUTPATIENT)
Dept: PAIN CLINIC | Facility: CLINIC | Age: 70
End: 2025-02-20

## 2025-02-20 NOTE — TELEPHONE ENCOUNTER
Per ELVIA Young offering to schedule as a  new patient. Please assist in scheduling when patient calls back.

## 2025-02-25 ENCOUNTER — OFFICE VISIT (OUTPATIENT)
Dept: PAIN CLINIC | Facility: CLINIC | Age: 70
End: 2025-02-25
Payer: MEDICARE

## 2025-02-25 ENCOUNTER — E-ADVICE (OUTPATIENT)
Dept: CARDIOLOGY | Age: 70
End: 2025-02-25

## 2025-02-25 VITALS
HEART RATE: 66 BPM | WEIGHT: 172 LBS | SYSTOLIC BLOOD PRESSURE: 112 MMHG | BODY MASS INDEX: 25 KG/M2 | OXYGEN SATURATION: 96 % | DIASTOLIC BLOOD PRESSURE: 68 MMHG

## 2025-02-25 DIAGNOSIS — M48.062 SPINAL STENOSIS OF LUMBAR REGION WITH NEUROGENIC CLAUDICATION: Primary | ICD-10-CM

## 2025-02-25 PROCEDURE — 99214 OFFICE O/P EST MOD 30 MIN: CPT | Performed by: PHYSICIAN ASSISTANT

## 2025-02-25 RX ORDER — LISINOPRIL 5 MG/1
5 TABLET ORAL DAILY
COMMUNITY
Start: 2024-07-16

## 2025-02-25 RX ORDER — EMPAGLIFLOZIN 25 MG/1
25 TABLET, FILM COATED ORAL DAILY
COMMUNITY
Start: 2023-05-20

## 2025-02-25 NOTE — PATIENT INSTRUCTIONS
Refill policies:    Allow 2-3 business days for refills; controlled substances may take longer.  Contact your pharmacy at least 5 days prior to running out of medication and have them send an electronic request or submit request through the “request refill” option in your [facility] account.  Refills are not addressed on weekends; covering physicians do not authorize routine medications on weekends.  No narcotics or controlled substances are refilled after noon on Fridays or by on call physicians.  By law, narcotics must be electronically prescribed.  A 30 day supply with no refills is the maximum allowed.  If your prescription is due for a refill, you may be due for a follow up appointment.  To best provide you care, patients receiving routine medications need to be seen at least once a year.  Patients receiving narcotic/controlled substance medications need to be seen at least once every 3 months.  In the event that your preferred pharmacy does not have the requested medication in stock (e.g. Backordered), it is your responsibility to find another pharmacy that has the requested medication available.  We will gladly send a new prescription to that pharmacy at your request.    Scheduling Tests:    If your physician has ordered radiology tests such as MRI or CT scans, please contact Central Scheduling at [phone] right away to schedule the test.  Once scheduled, the [facility] Centralized Referral Team will work with your insurance carrier to obtain pre-certification or prior authorization.  Depending on your insurance carrier, approval may take 3-10 days.  It is highly recommended patients assure they have received an authorization before having a test performed.  If test is done without insurance authorization, patient may be responsible for the entire amount billed.      Precertification and Prior Authorizations:  If your physician has recommended that you have a procedure or additional testing performed the [facility]  Centralized Referral Team will contact your insurance carrier to obtain pre-certification or prior authorization.    You are strongly encouraged to contact your insurance carrier to verify that your procedure/test has been approved and is a COVERED benefit.  Although the [facility] Centralized Referral Team does its due diligence, the insurance carrier gives the disclaimer that \"Although the procedure is authorized, this does not guarantee payment.\"    Ultimately the patient is responsible for payment.   Thank you for your understanding in this matter.  Paperwork Completion:  If you require FMLA or disability paperwork for your recovery, please make sure to either drop it off or have it faxed to our office at [phone]. Be sure the form has your name and date of birth on it.  The form will be faxed to our Forms Department and they will complete it for you.  There is a 25$ fee for all forms that need to be filled out.  Please be aware there is a 10-14 day turnaround time.  You will need to sign a release of information (VIKAS) form if your paperwork does not come with one.  You may call the Forms Department with any questions at [phone].  Their fax number is [phone].

## 2025-02-25 NOTE — PROGRESS NOTES
Subjective:   Patient ID: Ananda Perez is a 70 year old male.    HPI    History/Other:   Review of Systems  Current Outpatient Medications   Medication Sig Dispense Refill   • metFORMIN 500 MG Oral Tab Take 1 tablet (500 mg total) by mouth 2 (two) times daily with meals. 360 tablet 0   • Microlet Lancets Does not apply Misc Check BG  2 times daily 100 each 6   • atorvastatin 20 MG Oral Tab Take 1 tablet (20 mg total) by mouth daily.     • Glucose Blood (CONTOUR NEXT TEST) In Vitro Strip Test BID dx E11.9 180 each 3   • Blood Glucose Monitoring Suppl (CONTOUR NEXT MONITOR) w/Device Does not apply Kit 1 each by Does not apply route 2 (two) times a day. dx E11.9 1 kit 0   • Microlet Lancets Does not apply Misc Test BID dx E11.9 180 each 3   • EZETIMIBE 10 MG Oral Tab TAKE 1 TABLET BY MOUTH DAILY 90 tablet 0   • metoprolol Tartrate 25 MG Oral Tab Take 1 tablet (25 mg total) by mouth 2 (two) times daily. 180 tablet 3   • digoxin 0.25 MG Oral Tab Take 1 tablet (0.25 mg total) by mouth daily. 90 tablet 3   • Levocetirizine Dihydrochloride 5 MG Oral Tab Take 1 tablet (5 mg total) by mouth daily.     • Turmeric 500 MG Oral Cap Take 1 capsule by mouth daily.     • omega-3 fatty acids (FISH OIL) 1000 MG Oral Cap Take 1,000 mg by mouth daily.       Allergies:Allergies[1]    Objective:   Physical Exam  Constitutional:          Assessment & Plan:   No diagnosis found.    No orders of the defined types were placed in this encounter.      Meds This Visit:  Requested Prescriptions      No prescriptions requested or ordered in this encounter       Imaging & Referrals:  None        Location of Pain: cervical, thoracic and lumbar regions    Date Pain Began: 12/2023          Work Related:   No        Receiving Work Comp/Disability:   No    Numeric Rating Scale:  Pain at Present:  4                                                                                                            (No Pain) 0  to  10 (Worst Pain)                  Minimum Pain:   0  Maximum Pain  5    Distribution of Pain:    bilateral    Quality of Pain:   aching, numbness, and tingling    Origin of Pain:    Lifting    Aggravating Factors:    Walking    Past Treatments for Current Pain Condition:   Physical Therapy    Prior diagnostic testing for your pain:  mri          [1]   Allergies  Allergen Reactions   • Cleocin DIARRHEA   • Seasonal Runny nose

## 2025-02-25 NOTE — PROGRESS NOTES
Patient: Ananda Perez  Medical Record Number: [id number]  Site: [facility]  Referring Physician:  EMILIANO Rivera  PCP: Dr. Hannah    Dear Dr. Rivera:    Thank you very much for requesting this consultation. I had the opportunity to evaluate and initiate care for your patient today, as per your request.    HISTORY OF CHIEF COMPLAINT:      Ananda Perez is a 70 year old male, who complains of low back and B lateral thigh pain.    Patient is here today with pain in above-described distributional that began years ago atraumatically.  Had been evaluated years ago for an EMG, which had been consistent with a peripheral neuropathy, and does carry diagnosis of DM, though has not been evaluated by neurology.  He had been evaluated by surgery, Dr. Rivera, and had discussed injections versus surgery after an MRI, though did not pursue either option.  With time, symptoms continued to progress and return to surgery where he was sent for updated imaging, completed on 2/6/2025.  Sent for consideration of JENNIE's.    VAS Pain Score:  0-5/10    Aggravating Factors: Relieving Factors:   Standing  Walking  Sitting  Rest      Past Treatment Attempted/Patient’s Response:  As above     Past Medical History:    Back problem    cervical spine    Fatty liver    High blood pressure    High cholesterol    OTHER DISEASES    tachycardia    Psoriasis    Worse in the winter    PSVT (paroxysmal supraventricular tachycardia) (HCC)    Sleep apnea    AHI 45 RDI 47 REM AHI 0 Supine AHI 45 non-supine AHI 0.0 Sao2 Manjinder 90%    Visual impairment    reading glasses      Past Surgical History:   Procedure Laterality Date    Back surgery  04/30/2018    C3-C6 ACDF     Cardiac catheterization  1997    No coronary lesions      Family History   Problem Relation Age of Onset    Hypertension Father     Heart Disorder Father     Hypertension Mother     Heart Disorder Mother     Other (HOLLAND) Brother       Social History     Socioeconomic History     Marital status:     Number of children: 1   Occupational History    Occupation: [employer]     Employer: [employer] [place] [address]   Tobacco Use    Smoking status: Former     Current packs/day: 0.00     Types: Cigarettes     Quit date: 1981     Years since quittin.1    Smokeless tobacco: Former   Vaping Use    Vaping status: Never Used   Substance and Sexual Activity    Alcohol use: Yes     Comment: social    Drug use: No   Other Topics Concern    Exercise No    Seat Belt Yes      Current Medications:  Current Outpatient Medications   Medication Sig Dispense Refill    lisinopril 5 MG Oral Tab Take 1 tablet (5 mg total) by mouth daily.      JARDIANCE 25 MG Oral Tab Take 1 tablet (25 mg total) by mouth daily.      Microlet Lancets Does not apply Misc Check BG  2 times daily 100 each 6    atorvastatin 20 MG Oral Tab Take 1 tablet (20 mg total) by mouth daily.      Glucose Blood (CONTOUR NEXT TEST) In Vitro Strip Test BID dx E11.9 180 each 3    Blood Glucose Monitoring Suppl (CONTOUR NEXT MONITOR) w/Device Does not apply Kit 1 each by Does not apply route 2 (two) times a day. dx E11.9 1 kit 0    EZETIMIBE 10 MG Oral Tab TAKE 1 TABLET BY MOUTH DAILY 90 tablet 0    metoprolol Tartrate 25 MG Oral Tab Take 1 tablet (25 mg total) by mouth 2 (two) times daily. 180 tablet 3    Levocetirizine Dihydrochloride 5 MG Oral Tab Take 1 tablet (5 mg total) by mouth daily.      Turmeric 500 MG Oral Cap Take 1 capsule by mouth daily.      omega-3 fatty acids (FISH OIL) 1000 MG Oral Cap Take 1,000 mg by mouth daily.      digoxin 0.25 MG Oral Tab Take 1 tablet (0.25 mg total) by mouth daily. 90 tablet 3        Functional Assessment: Patient reports that they are able to complete all of their ADL's such as eating, bathing, using the toilet, dressing and getting up from a bed or a chair independently.    Work History:  The patient currently is retired.    REVIEW OF SYSTEMS:   10 point review of systems is otherwise negative,unless  otherwise in HPI.      Radiology/Lab Test Reviewed:  MRI L spine 2/6/25:    LUMBAR DISC LEVELS   L1-L2:  There is extensive degenerative disc disease with extensive disc desiccation and disc height loss.  There is a broad-based disc bulge within a superimposed large left paracentral disc protrusion that measures 0.8 x 0.9 cm in AP and transverse dimensions respectively.  There is moderate bilateral facet arthropathy with hypertrophy and ligamentum flavum thickening.  There is secondary moderate central canal stenosis, severe left axillary recess stenosis, severe bilateral subarticular stenosis and mild to moderate bilateral neural foraminal stenosis.     L2-L3:  There is extensive disc desiccation and disc height loss with a broad-based disc bulge/osteophyte complex.  There is moderate bilateral facet arthropathy with hypertrophy and ligamentum flavum thickening.  There is moderate central canal stenosis, severe bilateral subarticular stenosis and moderate bilateral neural foraminal stenosis.     L3-L4:  There is extensive disc desiccation, severe disc space loss with a pronounced broad-based disc bulge/osteophyte complex.  There is moderate bilateral facet arthropathy and ligamentum flavum thickening.  There is moderate to severe central canal stenosis, severe bilateral subarticular stenosis and moderate bilateral neural foraminal stenosis.     L4-L5:  There is severe disc space loss, severe disc desiccation with a broad-based disc bulge/osteophyte complex.  There is moderate bilateral facet arthropathy with hypertrophy and ligamentum flavum thickening.  There is mild to moderate central canal stenosis, severe bilateral subarticular stenosis and moderate bilateral neural foraminal stenosis.     L5-S1:  There is severe disc disease with severe disc space loss and broad-based disc bulge/osteophyte complex.  There is severe bilateral facet arthropathy with hypertrophy and ligamentum a full Lava thickening.  There is  mild to moderate central canal stenosis, severe bilateral subarticular stenosis and moderate to severe bilateral neural foraminal stenosis.     CONCLUSION:    1. No acute process.   2. There is moderate to severe multilevel degenerative disc disease and facet arthropathy, with significant multilevel spinal canal stenosis, subarticular and neural foraminal stenosis throughout the entirety of the lumbar spine.   3. 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   4. Please see further details above.     CBC:    Lab Results   Component Value Date    WBC 10.42 12/01/2020    WBC 10.72 11/27/2019    WBC 13.9 (H) 05/01/2018   No results found for: \"HEMOGLOBIN\"  Lab Results   Component Value Date     12/01/2020     11/27/2019    .0 05/01/2018       PHYSICAL EXAMIMATION   PHYSICAL EXAMINATION: Ananda Perez is a 70 year old male who is observed sitting comfortably on a chair in the exam room alert and oriented times three. He looks consistent with his stated age.    Ht Readings from Last 1 Encounters:   02/13/25 69\"     Wt Readings from Last 1 Encounters:   02/25/25 172 lb (78 kg)     The patient is well developed, well nourished, normal body habitus, well muscled. He moves independently from sitting to standing with difficulty.       Inspection:  No acute distress    Patient displays Antalgic gait.    Coordination:  Well-coordinated, fluent gait, able to engage in rapid alternating movements of upper and lower extremities.    ROM Lumbar Spine:  See chart below:  Motion Right (+ or -) Left (+ or -)   Lumbar Flexion + +   Lumbar Extension + +   Lumbar Bending + +   Lumbar Extension/ twisting motion + +     Lumbar/Sacral Integument:      Palpation:  See chart below:  Palpation of lumbar area Right (+ or -) Left (+ or -)   Lumbar facets - -   Lumbar paraspinals - -   Piriformis - -   SIJ - -   Trochanteric Bursa - -      Deep Tendon Reflexes:  Grossly intact to bilateral lower extremities 2/4 throughout    Strength:  Strength of bilateral lower extremities grossly intact; 5/5 throughout    Sensation:  No sensory deficits noted on bilateral lower extremities to light touch    Tests:  Test Right (+ or -) Left (+ or -)   SLR - -   Armen’s     Babinski     Clonus       HEAD/NECK: Head is normocephalic, neck supple  EYES: EOMI, TYREE  SKIN EXAM: Skin is intact, head, neck, trunk and arms/legs. No rashes, mottling or ulcerations.  LYMPH EXAM: There is no lymph edema in either lower extremity.  VASCULAR EXAM: Pedal pulses are normal bilaterally, with good distal perfusion. No clubbing or cyanosis.  ABDOMINAL EXAM: Abdomen is soft without masses palpated.  HEART: normal, regular, S1 and S2  LUNGS:CTA  MUSCULOSKELETAL: Smooth, pain-free ROM to bilat hips, ankles, and knees.     Do you have any known blood/bleeding disorders?  No  Does patient currently take blood thinners?   None  Does patient currently take any antibiotics?   No      Patient is currently on pain medications:  No  Reason pain medications are prescribed: N/A  Pain medications are prescribed by: N/A  [place] Prescription Monitoring review: N/A  DIRE: N/A  Treatment decision: N/A    MEDICAL DECISION MAKING:     Impression: Lumbar stenosis with neurogenic claudication, neuropathy    Patient has low back and radiating lower extremity pain in the setting of MRI evidence of of advanced degenerative changes and varying degrees of severe stenosis (seemingly worse at L3/4, though certainly significant elsewhere).  Has EMG proven neuropathy, and does have diabetes.  Symptoms have been progressively present for years, and had been offered surgery and shots in the past, though declined to proceed citing anxiety about the injection.  As symptoms have worsened, returned with surgeon and he was again sent for consideration of injections and is here today to proceed.    On exam, has  pain with range of motion lumbar spine in all planes of motion.  No focal sensory/motor deficits.  Negative straight leg raise.    We did discuss options, the patient does wish to proceed with LESI, risks and benefits of which were reviewed in detail.  That said, he has a significant psoriatic rash covering his entire lumbar spine and also into the sacral/coccygeal region.  This could conceivably limit our ability to access the canal safely, and did confer with Dr. Blackburn who asked that he discuss this with his treating providers to try to clear up patch for us to access the spine without going through psoriatic tissue.    Plan: Happy to proceed with LESI soon as he is able to reduce the significance of his psoriatic rash covering the lumbar spine.  Once there is clear tissue,  would be happy to proceed with injection.    The patient indicates understanding of these issues and agrees to the plan.      Thank you very much.     Respectfully yours,    ARNOLDO Kowalski

## 2025-02-26 ENCOUNTER — PATIENT MESSAGE (OUTPATIENT)
Dept: ORTHOPEDICS CLINIC | Facility: CLINIC | Age: 70
End: 2025-02-26

## 2025-03-25 ENCOUNTER — TELEPHONE (OUTPATIENT)
Dept: PAIN CLINIC | Facility: CLINIC | Age: 70
End: 2025-03-25

## 2025-03-25 DIAGNOSIS — M54.16 LUMBAR RADICULITIS: Primary | ICD-10-CM

## 2025-03-25 NOTE — TELEPHONE ENCOUNTER
Order Questions    Question Answer   Anesthesia Type Local   Provider Alexey   Location [facility] Procedure Lab   Procedure Lumbar JENNIE   CPT (Hit enter after each entry) NJX INTERLAMINAR LMBR/SAC   Medical clearance requested (will send to Pain Navigator) No   Patient has Medicare coverage? Yes

## 2025-03-25 NOTE — TELEPHONE ENCOUNTER
Patient advised of insurance approval to proceed with injections and is agreeable to scheduling. pre-procedure instructions reviewed. Patient prefers Local sedation. Reviewed sedation instructions including No Fasting & No  Required. Patient encouraged but not required to hold Omega-3 & Turmeric supplement for 5 days prior to procedure. Patient verbalized understanding of instructions, no further needs at this time.       Patient mentioned that he applies psoriasis ointment on his lower back. Informed patient that will check with  but he will most likely need to avoid ointment the day of procedure.  Patient VU.       [facility] PAIN CLINIC  PRE-PROCEDURE INSTRUCTIONS WITHOUT SEDATION    Procedure: LESI       Appointment Date: 04/08/2025  Check-In Time: 10:15 AM    Follow-Up Date/Time: 04/22/2025 @ 09:30 AM    Prior to the procedure:  Please update us prior to the procedure if you are experiencing any symptoms of infection such as cough, fever, chills, urinary symptoms, or have recently been prescribed antibiotics, have open wounds, have recently had surgery or dental procedures.    Day of Procedure:  **Drivers will be required for patients who receive prescriptions for Valium.    NO FASTING REQUIRED  Please bring your Insurance Card, Photo ID, List of Current Medications and Referral (if applicable) to your appointment.  Please park in the [facility] and follow the signs [facility] [place] [facility] [place].  Check in at [facility] ([address] [place] [address]) outpatient registration in the [facility] [place].  Please note-No prescriptions will be written by Pain Clinic in [place] on the day of procedure. If you require a refill of medications, please contact the office 48 hours prior to your procedure.  If you have an implanted Spinal Cord or Peripheral Nerve Stimulator: Please remember to turn device off for procedure.        Medication Hold:    Number of days you need to be off for the following  medications:    Aggrenox 10 days   Agrylin (Anagrelide) 10 days  Brilinta (Ticagrelor) 7 days  Imbruvica (Ibrutinib) 3 days   Enbrel (Etanercept) 24 hours   Fragmin (Dalteparin) 24 hours   Pletal (Cilostazol) 7 days  Effient (Prasugrel) 7 days  Pradaxa 10 days  Trental 7 days  Eliquis (Apixaban) 3 days  Xarelto (Rivaroxaban) 3 days  Lovenox (Enoxaparin) 24 hours  Aspirin  Greater than 81mg but less than 325mg   5 days  325mg and greater                  7 days  Coumadin       5 days  Procedure may be cancelled if INR is elevated.   Excedrin (with aspirin) 7 days  Plavix (Clopidogrel)                            7 days    NSAIDs: 24 hours preferred      Ibuprofen (Motrin, Advil, Vicoprofen), Naproxen (Naprosyn, Aleve), Piroxcam (Feldene), Meloxicam (Mobic), Oxaprozin (Daypro), Diclofenac (Voltaren), Indomethacin (Indocin), Etodolac (Lodine), Nabumetone (Relafen), Celebrex (Celecoxib)           HERBAL SUPPLEMENTS  5 days preferred  Fish oil, krill oil, Omega-3, Vascepa, Vitamin E, Turmeric, Garlic                       Insurance Authorization:   Most insurances are now requiring a preauthorization for all procedures.  In the event that your insurance does not authorize your procedure within 48 hours of the scheduled date, your procedure will be cancelled and rescheduled to a later date.  Please contact your insurance carrier to determine what your financial responsibility will be for the procedure(s).      Cancellation/Rescheduling Appointment:   In the event you need to cancel or reschedule your appointment, you must notify the office 24 hours prior.    Post-procedure instructions:        Please schedule a follow up visit within 2 to 4 weeks after your last procedure date   Please call our office with any questions or concerns before or after your procedure at  [phone].  If you are a diabetic, please increase the frequency of your glucose monitoring after the procedure as this may cause a temporary increase in  your blood sugar.  Contact your primary care physician if your blood sugar rises as you may require some medication adjustment.  It is normal to have increased pain at injection site for up to 3-5 days after procedure, you can use heat or ice (20 minutes on 20 minutes off) for comfort.    **To hear a recorded version of these instructions, please call [phone] and follow the prompts.  **Para escuchar las instrucciones en Español, por favor de llamar el marleen [phone] opción 4.

## 2025-03-27 ENCOUNTER — APPOINTMENT (OUTPATIENT)
Dept: PULMONOLOGY | Age: 70
End: 2025-03-27

## 2025-04-05 ENCOUNTER — TELEPHONE (OUTPATIENT)
Dept: CARDIOLOGY | Age: 70
End: 2025-04-05

## 2025-04-05 DIAGNOSIS — I47.10 PSVT (PAROXYSMAL SUPRAVENTRICULAR TACHYCARDIA) (CMD): ICD-10-CM

## 2025-04-05 DIAGNOSIS — I47.10 SVT (SUPRAVENTRICULAR TACHYCARDIA) (CMD): ICD-10-CM

## 2025-04-05 DIAGNOSIS — E78.2 MIXED HYPERLIPIDEMIA: ICD-10-CM

## 2025-04-08 ENCOUNTER — APPOINTMENT (OUTPATIENT)
Dept: GENERAL RADIOLOGY | Facility: HOSPITAL | Age: 70
End: 2025-04-08
Attending: ANESTHESIOLOGY
Payer: MEDICARE

## 2025-04-08 ENCOUNTER — HOSPITAL ENCOUNTER (OUTPATIENT)
Facility: HOSPITAL | Age: 70
Setting detail: HOSPITAL OUTPATIENT SURGERY
Discharge: HOME | End: 2025-04-08
Attending: ANESTHESIOLOGY | Admitting: ANESTHESIOLOGY
Payer: MEDICARE

## 2025-04-08 VITALS
SYSTOLIC BLOOD PRESSURE: 144 MMHG | DIASTOLIC BLOOD PRESSURE: 63 MMHG | HEART RATE: 63 BPM | HEIGHT: 69 IN | TEMPERATURE: 98 F | BODY MASS INDEX: 25.48 KG/M2 | WEIGHT: 172 LBS | RESPIRATION RATE: 18 BRPM | OXYGEN SATURATION: 100 %

## 2025-04-08 PROBLEM — M48.062 SPINAL STENOSIS OF LUMBAR REGION WITH NEUROGENIC CLAUDICATION: Status: ACTIVE | Noted: 2025-04-08

## 2025-04-08 LAB — GLUCOSE BLD-MCNC: 116 MG/DL (ref 70–99)

## 2025-04-08 PROCEDURE — 3E0S33Z INTRODUCTION OF ANTI-INFLAMMATORY INTO EPIDURAL SPACE, PERCUTANEOUS APPROACH: ICD-10-PCS | Performed by: ANESTHESIOLOGY

## 2025-04-08 PROCEDURE — 62323 NJX INTERLAMINAR LMBR/SAC: CPT | Performed by: ANESTHESIOLOGY

## 2025-04-08 RX ORDER — LIDOCAINE HYDROCHLORIDE 10 MG/ML
INJECTION, SOLUTION EPIDURAL; INFILTRATION; INTRACAUDAL; PERINEURAL
Status: DISCONTINUED | OUTPATIENT
Start: 2025-04-08 | End: 2025-04-08

## 2025-04-08 RX ORDER — DEXAMETHASONE SODIUM PHOSPHATE 10 MG/ML
INJECTION, SOLUTION INTRAMUSCULAR; INTRAVENOUS
Status: DISCONTINUED | OUTPATIENT
Start: 2025-04-08 | End: 2025-04-08

## 2025-04-08 RX ORDER — SODIUM CHLORIDE 9 MG/ML
INJECTION, SOLUTION INTRAMUSCULAR; INTRAVENOUS; SUBCUTANEOUS
Status: DISCONTINUED | OUTPATIENT
Start: 2025-04-08 | End: 2025-04-08

## 2025-04-08 RX ORDER — NALOXONE HYDROCHLORIDE 0.4 MG/ML
0.08 INJECTION, SOLUTION INTRAMUSCULAR; INTRAVENOUS; SUBCUTANEOUS AS NEEDED
Status: DISCONTINUED | OUTPATIENT
Start: 2025-04-08 | End: 2025-04-08

## 2025-04-08 RX ORDER — ASPIRIN 81 MG/1
81 TABLET ORAL DAILY
COMMUNITY

## 2025-04-08 NOTE — DISCHARGE INSTRUCTIONS
Home Care Instructions Following Your Pain Procedure     Ananda,  It has been a pleasure to have you as our patient. To help you at home, you must follow these general discharge instructions. We will review these with you before you are discharged. It is our hope that you have a complete and uneventful recovery from our procedure.     General Instructions:  What to Expect:  Bandages from your procedure today can be removed when you get home.  Please avoid soaking and/or swimming for 24 hours.  Showering is okay  It is normal to have increased pain symptoms and/or pain at injection site for up to 3-5 days after procedure, you can use heat or ice (20 minutes on 20 minutes off) for comfort.  You may experience some temporary side effects which may include restlessness or insomnia, flushing of the face, or heart palpitations.  Please contact the provider if these symptoms do not resolve within 3-4 days.  Lightheadedness or nausea may occur and should resolve within 24 to 48 hours.  If you develop a headache after treatment, rest, drink fluids (with caffeine, if possible) and take mild over-the-counter pain medication.  If the headache does not improve with the above treatment, contact the physician.  Home Medications:  Resume all previously prescribed medication.  Please avoid taking NSAIDs (Non-Steriodal Anti-Inflammatory Drugs) such as:  Ibuprofen ( Advil, Motrin) Aleve (Naproxen), Diclofenac, Meloxicam for 6 hours after procedure.   If you are on Coumadin (Warfarin) or any other anti-coagulant (or \"blood thinning\") medication such as Plavix (Clopidogrel), Xarelto (Rivaroxaban), Eliquis (Apixaban), Effient (Prasugrel) etc., restart on the following day from the procedure unless otherwise directed by your provider.  If you are a diabetic, please increase the frequency of your glucose monitoring after the procedure as steroids may cause a temporary (2-3 day) increase in your blood sugar.  Contact your primary care  physician if your blood sugar remains elevated as you may require some medication adjustment.  Diet:  Resume your regular diet as tolerated.  Activity:  We recommend that you relax and rest during the rest of your procedure day.  If you feel weakness in your arms or legs do not drive.  Follow-up Appointment  Please schedule a follow-up visit within 3 to 4 weeks after your last procedure date.  Question or Concerns:  Feel free to call our office with any questions or concerns at [phone] (option #2)    Ananda  Thank you for coming to [facility] for your procedure.  The nurses try very hard to make sure you receive the best care possible.  Your trust in them as well as us is greatly appreciated.    Thanks so much,   Dr. Yo Blackburn

## 2025-04-08 NOTE — OPERATIVE REPORT
[facility]  Operative Report  2025     Ananda Perez Patient Status:  [facility]    1955 MRN [id number]   Location [facility] PAIN CENTER Attending Yo Blackburn MD   Hosp Day # 0 PCP Maria Luisa Hannah MD     Indication: Ananda is a 70 year old male with lumbar spinal stenosis    Preoperative Diagnosis:  Lumbar radiculitis [M54.16]    Postoperative Diagnosis: Same as above.    Procedure performed: LUMBAR INTERLAMINAR EPIDURAL INJECTION with local    Anesthesia: Local      EBL: Less than 1 ml.    Procedure Description:  After reviewing the patient's history and performing a focused physical examination, the diagnosis and positive previous diagnostic tests were confirmed and contraindications such as infection and coagulopathy were ruled out.  Following review of allergies and potential side effects and complications, including but not necessarily limited to infection, allergic reaction, local tissue breakdown, nerve injury, post-dural puncture headache and paresis, the patient consented for the procedure.    The patient was brought to the procedure room in prone position.  After sterile prep of the lumbar spine, the L4-L5 interspace was identified with the help of fluoroscopy. Local anesthetic was given by a 25 gauge 1.5 inch needle with 1% lidocaine in that space level.  Thereafter, a 20 gauge Tuohy needle was introduced and advanced under fluoroscopy.  The epidural space was accessed by using loss of resistance to air technique.  The needle position was confirmed with AP and lateral view under fluoroscopy.  Omnipaque 180 was injected in 1 mL volume. A good epidurogram was obtained.  Thereafter, dexamethasone 10 mg with normal saline of 5 mL in total volume of 6 mL was injected through the Tuohy needle.  The needle was flushed with 1 mL lidocaine.  The needle was withdrawn with the stylet intact in situ.  The needle's tip was intact.  The patient tolerated the  procedure very well without significant immediate complication.  The patient's back was cleaned and sterile dressing was applied.  The patient was discharged with an instruction to a responsible adult after discharge criteria were met.  The patient was advised to contact us should any problems happen.  The patient was also informed to go to the emergency room immediately if experiencing severe numbness or weakness in the extremities or experiencing bowel or bladder incontinence.            Complications: None.    Follow up: The patient was followed in the pain clinic as needed basis.          Yo Blackburn MD

## 2025-04-08 NOTE — H&P
History & Physical Examination    Patient Name: Ananda Perez  MRN: [id number]  CSN: [id number]  YOB: 1955    Pre-Operative Diagnosis:  Lumbar radiculitis [M54.16]    Present Illness: Lumbar radiculitis    ASA: 3  MP class: 1  Sedation: Local      Prescriptions Prior to Admission[1]  No current facility-administered medications for this encounter.       Allergies: Allergies[2]    Past Medical History:    Back problem    cervical spine    Fatty liver    High blood pressure    High cholesterol    OTHER DISEASES    tachycardia    Psoriasis    Worse in the winter    PSVT (paroxysmal supraventricular tachycardia) (HCC)    Sleep apnea    AHI 45 RDI 47 REM AHI 0 Supine AHI 45 non-supine AHI 0.0 Sao2 Manjinder 90%    Visual impairment    reading glasses     Past Surgical History:   Procedure Laterality Date    Back surgery  2018    C3-C6 ACDF     Cardiac catheterization      No coronary lesions     Family History   Problem Relation Age of Onset    Hypertension Father     Heart Disorder Father     Hypertension Mother     Heart Disorder Mother     Other (HOLLAND) Brother      Social History     Tobacco Use    Smoking status: Former     Current packs/day: 0.00     Types: Cigarettes     Quit date: 1981     Years since quittin.2    Smokeless tobacco: Former   Substance Use Topics    Alcohol use: Yes     Comment: social       SYSTEM Check if Review is Normal Check if Physical Exam is Normal If not normal, please explain:   HEENT [x ] [x ]    NECK & BACK [x ] [x ]    HEART [x ] [x ]    LUNGS [x ] [x ]    ABDOMEN [x ] [x ]    UROGENITAL [x ] [x ]    EXTREMITIES [x ] [x ]    OTHER        [ x ] I have discussed the risks and benefits and alternatives with the patient/family.  They understand and agree to proceed with plan of care.  [ x ] I have reviewed the History and Physical done within the last 30 days.  Any changes noted above.    Yo Blackburn MD              [1]   Medications Prior to Admission    Medication Sig Dispense Refill Last Dose/Taking    aspirin 81 MG Oral Tab EC Take 1 tablet (81 mg total) by mouth daily.   Past Week    lisinopril 5 MG Oral Tab Take 1 tablet (5 mg total) by mouth daily.   4/8/2025 at  7:00 AM    JARDIANCE 25 MG Oral Tab Take 1 tablet (25 mg total) by mouth daily.   4/8/2025 at  7:00 AM    Microlet Lancets Does not apply Misc Check BG  2 times daily 100 each 6     atorvastatin 20 MG Oral Tab Take 1 tablet (20 mg total) by mouth daily.   4/8/2025 at  7:00 AM    Glucose Blood (CONTOUR NEXT TEST) In Vitro Strip Test BID dx E11.9 180 each 3     Blood Glucose Monitoring Suppl (CONTOUR NEXT MONITOR) w/Device Does not apply Kit 1 each by Does not apply route 2 (two) times a day. dx E11.9 1 kit 0     EZETIMIBE 10 MG Oral Tab TAKE 1 TABLET BY MOUTH DAILY 90 tablet 0 Unknown    metoprolol Tartrate 25 MG Oral Tab Take 1 tablet (25 mg total) by mouth 2 (two) times daily. 180 tablet 3 4/8/2025 at  7:00 AM    digoxin 0.25 MG Oral Tab Take 1 tablet (0.25 mg total) by mouth daily. 90 tablet 3 4/8/2025    Levocetirizine Dihydrochloride 5 MG Oral Tab Take 1 tablet (5 mg total) by mouth daily.   4/8/2025 at  7:00 AM    Turmeric 500 MG Oral Cap Take 1 capsule by mouth daily.   Past Week    omega-3 fatty acids (FISH OIL) 1000 MG Oral Cap Take 1,000 mg by mouth daily.   Past Week   [2]   Allergies  Allergen Reactions    Cleocin DIARRHEA    Seasonal Runny nose

## 2025-04-09 ENCOUNTER — TELEPHONE (OUTPATIENT)
Dept: PAIN CLINIC | Facility: CLINIC | Age: 70
End: 2025-04-09

## 2025-04-09 NOTE — TELEPHONE ENCOUNTER
Courtesy called placed to patient for post procedure follow up. Patient stated he is doing fine. Educated patient that it takes 3-5 days for the steroid to be effective and to allow adequate time for medication to work. Pt verbalized understanding to call with any questions or concerns.       Procedure: LESI  Date: 4/8/25  Follow up Visit Scheduled: 4/22/2025 10:00 AM with Hiral

## 2025-04-14 RX ORDER — ATORVASTATIN CALCIUM 20 MG/1
20 TABLET, FILM COATED ORAL DAILY
Qty: 90 TABLET | Refills: 3 | Status: SHIPPED | OUTPATIENT
Start: 2025-04-14

## 2025-04-14 RX ORDER — LISINOPRIL 5 MG/1
5 TABLET ORAL DAILY
Qty: 90 TABLET | Refills: 3 | Status: SHIPPED | OUTPATIENT
Start: 2025-04-14

## 2025-04-22 ENCOUNTER — OFFICE VISIT (OUTPATIENT)
Dept: PAIN CLINIC | Facility: CLINIC | Age: 70
End: 2025-04-22
Payer: MEDICARE

## 2025-04-22 VITALS — SYSTOLIC BLOOD PRESSURE: 118 MMHG | DIASTOLIC BLOOD PRESSURE: 72 MMHG | OXYGEN SATURATION: 97 % | HEART RATE: 57 BPM

## 2025-04-22 DIAGNOSIS — M48.062 SPINAL STENOSIS OF LUMBAR REGION WITH NEUROGENIC CLAUDICATION: ICD-10-CM

## 2025-04-22 DIAGNOSIS — M54.16 LUMBAR RADICULITIS: Primary | ICD-10-CM

## 2025-04-22 PROCEDURE — 99214 OFFICE O/P EST MOD 30 MIN: CPT | Performed by: NURSE PRACTITIONER

## 2025-04-22 NOTE — PATIENT INSTRUCTIONS
Refill policies:    Allow 2-3 business days for refills; controlled substances may take longer.  Contact your pharmacy at least 5 days prior to running out of medication and have them send an electronic request or submit request through the “request refill” option in your [employer] account.  Refills are not addressed on weekends; covering physicians do not authorize routine medications on weekends.  No narcotics or controlled substances are refilled after noon on Fridays or by on call physicians.  By law, narcotics must be electronically prescribed.  A 30 day supply with no refills is the maximum allowed.  If your prescription is due for a refill, you may be due for a follow up appointment.  To best provide you care, patients receiving routine medications need to be seen at least once a year.  Patients receiving narcotic/controlled substance medications need to be seen at least once every 3 months.  In the event that your preferred pharmacy does not have the requested medication in stock (e.g. Backordered), it is your responsibility to find another pharmacy that has the requested medication available.  We will gladly send a new prescription to that pharmacy at your request.    Scheduling Tests:    If your physician has ordered radiology tests such as MRI or CT scans, please contact Central Scheduling at [phone] right away to schedule the test.  Once scheduled, the [facility] Centralized Referral Team will work with your insurance carrier to obtain pre-certification or prior authorization.  Depending on your insurance carrier, approval may take 3-10 days.  It is highly recommended patients assure they have received an authorization before having a test performed.  If test is done without insurance authorization, patient may be responsible for the entire amount billed.      Precertification and Prior Authorizations:  If your physician has recommended that you have a procedure or additional testing performed the [facility]  Centralized Referral Team will contact your insurance carrier to obtain pre-certification or prior authorization.    You are strongly encouraged to contact your insurance carrier to verify that your procedure/test has been approved and is a COVERED benefit.  Although the [facility] Centralized Referral Team does its due diligence, the insurance carrier gives the disclaimer that \"Although the procedure is authorized, this does not guarantee payment.\"    Ultimately the patient is responsible for payment.   Thank you for your understanding in this matter.  Paperwork Completion:  If you require FMLA or disability paperwork for your recovery, please make sure to either drop it off or have it faxed to our office at [phone]. Be sure the form has your name and date of birth on it.  The form will be faxed to our Forms Department and they will complete it for you.  There is a 25$ fee for all forms that need to be filled out.  Please be aware there is a 10-14 day turnaround time.  You will need to sign a release of information (VIKAS) form if your paperwork does not come with one.  You may call the Forms Department with any questions at [phone].  Their fax number is [phone].

## 2025-04-22 NOTE — PROGRESS NOTES
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  He is here following up after his initial lumbar epidural steroid injection.     Today he presents with complaints of Low back pain radiating to bilateral thighs with neuropathy in his feet .    The pain is described as severe aching, stabbing, grabbing, shooting, soreness that is constant .  The patient’s activity level has remained the same since last visit.  The pain is worst unrelated to time of day.    Changes in condition/history since last visit: No changes in medical surgical history since last visit    Last procedure: #1 LESI    Date: 4/8/25    Percentage of relief experienced from the procedure: 10-15%    Duration of the relief: Since injection    The following activities will increase the patient’s pain: walking    The following activities decrease the patient’s pain: changing position, sitting and forward flexion    Functional Assessment: Patient reports that they are able to complete all of their ADL's such as eating, bathing, using the toilet, dressing and getting up from a bed or a chair independently.    A comprehensive 10 point review of systems was completed.  Pertinent positives and negatives noted in the the HPI.     Current Medications:  Current Medications[1]   Patient requires assistance with: No assistance required  Have you recently had any feelings of harming yourself or others? The patient's response was no.    Physical Exam:   /72 (BP Location: Right arm, Patient Position: Sitting, Cuff Size: adult)   Pulse 57   SpO2 97%   VAS Pain Score: 4/10 while sitting 8 out of 10 while standing and walking  General Appearance: Well developed, well nourished, normal build, independent body habitus, no apparent physical disabilities, well groomed    Neurological Exam: WNL-Orientation to time, place and  person, normal mood & effect, normal concentration & attention span  Inspection:   The patient is well developed, well nourished, normal body habitus, well muscled. He moves independently from sitting to standing with difficulty.        Inspection:  No acute distress     Patient displays Antalgic gait.  Patient is unable to rise on his toes secondary to neuropathy he can heel walk     Coordination:  Well-coordinated, fluent gait, able to engage in rapid alternating movements of upper and lower extremities.     ROM Lumbar Spine:  See chart below:  Motion Right (+ or -) Left (+ or -)   Lumbar Flexion + +   Lumbar Extension + +   Lumbar Bending + +   Lumbar Extension/ twisting motion + +      Lumbar/Sacral Integument:        Palpation:  See chart below:  Palpation of lumbar area Right (+ or -) Left (+ or -)   Lumbar facets - -   Lumbar paraspinals - -   Piriformis - -   SIJ - -   Trochanteric Bursa - -      Deep Tendon Reflexes:  Grossly intact to bilateral lower extremities 2/4 throughout     Strength:  Strength of bilateral lower extremities grossly intact; 5/5 throughout     Sensation:  No sensory deficits noted on bilateral lower extremities to light touch     Tests:  Test Right (+ or -) Left (+ or -)   SLR - -   Armen’s       Babinski       Clonus          HEAD/NECK: Head is normocephalic, neck supple  EYES: EOMI, TYREE  SKIN EXAM: Skin is intact, head, neck, trunk and arms/legs. No rashes, mottling or ulcerations.  LYMPH EXAM: There is no lymph edema in either lower extremity.  VASCULAR EXAM: Pedal pulses are normal bilaterally, with good distal perfusion. No clubbing or cyanosis.  MUSCULOSKELETAL: Smooth, pain-free ROM to bilat hips, ankles, and knees.      Radiology/Lab Test Reviewed:   MRI L spine 2/6/25:     LUMBAR DISC LEVELS   L1-L2:  There is extensive degenerative disc disease with extensive disc desiccation and disc height loss.  There is a broad-based disc bulge within a superimposed large left  paracentral disc protrusion that measures 0.8 x 0.9 cm in AP and transverse dimensions respectively.  There is moderate bilateral facet arthropathy with hypertrophy and ligamentum flavum thickening.  There is secondary moderate central canal stenosis, severe left axillary recess stenosis, severe bilateral subarticular stenosis and mild to moderate bilateral neural foraminal stenosis.     L2-L3:  There is extensive disc desiccation and disc height loss with a broad-based disc bulge/osteophyte complex.  There is moderate bilateral facet arthropathy with hypertrophy and ligamentum flavum thickening.  There is moderate central canal stenosis, severe bilateral subarticular stenosis and moderate bilateral neural foraminal stenosis.     L3-L4:  There is extensive disc desiccation, severe disc space loss with a pronounced broad-based disc bulge/osteophyte complex.  There is moderate bilateral facet arthropathy and ligamentum flavum thickening.  There is moderate to severe central canal stenosis, severe bilateral subarticular stenosis and moderate bilateral neural foraminal stenosis.     L4-L5:  There is severe disc space loss, severe disc desiccation with a broad-based disc bulge/osteophyte complex.  There is moderate bilateral facet arthropathy with hypertrophy and ligamentum flavum thickening.  There is mild to moderate central canal stenosis, severe bilateral subarticular stenosis and moderate bilateral neural foraminal stenosis.     L5-S1:  There is severe disc disease with severe disc space loss and broad-based disc bulge/osteophyte complex.  There is severe bilateral facet arthropathy with hypertrophy and ligamentum a full Lava thickening.  There is mild to moderate central canal stenosis, severe bilateral subarticular stenosis and moderate to severe bilateral neural foraminal stenosis.      CONCLUSION:    1. No acute process.   2. There is moderate to severe multilevel degenerative disc disease and facet arthropathy,  with significant multilevel spinal canal stenosis, subarticular and neural foraminal stenosis throughout the entirety of the lumbar spine.   3. 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   4. Please see further details above.   Lab Results   Component Value Date    WBC 10.42 12/01/2020    WBC 10.72 11/27/2019    WBC 13.9 (H) 05/01/2018   No results found for: \"HEMOGLOBIN\"  Lab Results   Component Value Date     12/01/2020     11/27/2019    .0 05/01/2018         Patient Education: Patient was advised to continue normal activities as tolerated and was advised against any form of bedrest, since recent guidelines promote and encourage physical activity for improvment of functionality and overall pain.  (Family Practice Vol. 16, No. 1, 25-36Â© [employer] 1999 ), Patient was given brochure,website information, and handouts., Patient was shown interactive content, shown and explained procedure on spinal model..  Patient educated and verbalized understanding.  Medical Decision Making:   Diagnosis:    Encounter Diagnoses   Name Primary?    Lumbar radiculitis Yes    Spinal stenosis of lumbar region with neurogenic claudication      Impression:   Patient is 70-year-old male with moderate to severe spinal stenosis L3-4 and multilevel degenerative changes.  He has completed a course of physical therapy and did receive a lumbar epidural steroid injection and is here today for follow-up.  He reports he has noted approximately 10 to 15% relief of symptoms however he still has significant symptoms and cannot walk for any period of time.  Patient would like to consider surgical intervention and would like to return to Dr. Rivera    Patient does report neuropathy in his feet is constant especially in his toes which is resulting in abnormal gait walking on his heels.  Patient has completed physical therapy to  prevent falls.  We did discuss that after his lumbar surgery if he is having worsening neuropathic symptoms in his feet we would consider him a candidate for spinal cord stimulation.  Patient can return to the clinic on an as-needed basis      Plan:   > Follow-up with Dr. Rivera for consideration for lumbar surgery (patient has had a cervical fusion through Dr. Rivera in the past)  > If neuropathy symptoms do not improve with lumbar surgery due to patient history of diabetes despite medication management and supplementation we can consider spinal cord stimulation for his neuropathy symptoms but must be completely healed from lumbar surgery  > Follow-up as needed  No orders of the defined types were placed in this encounter.      Meds & Refills for this Visit:  Requested Prescriptions      No prescriptions requested or ordered in this encounter       Imaging & Consults:  OP REFERRAL TO ORTHOPEDICS    The patient indicates understanding of these issues and agrees to the plan.      ID#[id number]             [1]   Current Outpatient Medications   Medication Sig Dispense Refill    aspirin 81 MG Oral Tab EC Take 1 tablet (81 mg total) by mouth daily.      lisinopril 5 MG Oral Tab Take 1 tablet (5 mg total) by mouth daily.      JARDIANCE 25 MG Oral Tab Take 1 tablet (25 mg total) by mouth daily.      Microlet Lancets Does not apply Misc Check BG  2 times daily 100 each 6    atorvastatin 20 MG Oral Tab Take 1 tablet (20 mg total) by mouth daily.      Glucose Blood (CONTOUR NEXT TEST) In Vitro Strip Test BID dx E11.9 180 each 3    Blood Glucose Monitoring Suppl (CONTOUR NEXT MONITOR) w/Device Does not apply Kit 1 each by Does not apply route 2 (two) times a day. dx E11.9 1 kit 0    EZETIMIBE 10 MG Oral Tab TAKE 1 TABLET BY MOUTH DAILY 90 tablet 0    metoprolol Tartrate 25 MG Oral Tab Take 1 tablet (25 mg total) by mouth 2 (two) times daily. 180 tablet 3    digoxin 0.25 MG Oral Tab Take 1 tablet (0.25 mg total) by mouth daily. 90 tablet  3    Levocetirizine Dihydrochloride 5 MG Oral Tab Take 1 tablet (5 mg total) by mouth daily.      Turmeric 500 MG Oral Cap Take 1 capsule by mouth daily.      omega-3 fatty acids (FISH OIL) 1000 MG Oral Cap Take 1,000 mg by mouth daily.

## 2025-04-22 NOTE — PROGRESS NOTES
Last procedure: LUMBAR INTERLAMINAR EPIDURAL INJECTION with local   Date: 4/8/25  Percentage of relief obtained: 15%  Duration of relief: current    Current Pain Score: 3-4/10    Narcotic Contract Exp: n/a

## 2025-04-28 ENCOUNTER — TELEPHONE (OUTPATIENT)
Dept: ORTHOPEDICS CLINIC | Facility: CLINIC | Age: 70
End: 2025-04-28

## 2025-04-28 DIAGNOSIS — M54.16 LUMBAR RADICULOPATHY: ICD-10-CM

## 2025-04-28 DIAGNOSIS — M51.26 HNP (HERNIATED NUCLEUS PULPOSUS), LUMBAR: ICD-10-CM

## 2025-04-28 DIAGNOSIS — M43.17 SPONDYLOLISTHESIS, LUMBOSACRAL REGION: ICD-10-CM

## 2025-04-28 DIAGNOSIS — M48.062 SPINAL STENOSIS OF LUMBAR REGION WITH NEUROGENIC CLAUDICATION: Primary | ICD-10-CM

## 2025-05-02 ENCOUNTER — PATIENT MESSAGE (OUTPATIENT)
Age: 70
End: 2025-05-02

## 2025-05-02 NOTE — TELEPHONE ENCOUNTER
Called patient and LVM.  Answered patient's questions.     DOS 5/28/25 L1-L5 decompression and uninstrumented fusion with L1-L2 discectomy

## 2025-05-04 DIAGNOSIS — I47.10 PSVT (PAROXYSMAL SUPRAVENTRICULAR TACHYCARDIA) (CMD): ICD-10-CM

## 2025-05-04 DIAGNOSIS — I47.10 SVT (SUPRAVENTRICULAR TACHYCARDIA) (CMD): ICD-10-CM

## 2025-05-04 DIAGNOSIS — E78.2 MIXED HYPERLIPIDEMIA: ICD-10-CM

## 2025-05-05 RX ORDER — DIGOXIN 250 MCG
250 TABLET ORAL DAILY
Qty: 90 TABLET | Refills: 1 | OUTPATIENT
Start: 2025-05-05 | End: 2025-11-01

## 2025-05-09 ENCOUNTER — TELEPHONE (OUTPATIENT)
Dept: FAMILY MEDICINE CLINIC | Facility: CLINIC | Age: 70
End: 2025-05-09

## 2025-05-14 ENCOUNTER — APPOINTMENT (OUTPATIENT)
Dept: PULMONOLOGY | Age: 70
End: 2025-05-14

## 2025-05-14 RX ORDER — DIGOXIN 250 MCG
250 TABLET ORAL DAILY
Qty: 90 TABLET | Refills: 1 | OUTPATIENT
Start: 2025-05-14 | End: 2025-11-10

## 2025-05-18 ASSESSMENT — ENCOUNTER SYMPTOMS
RESPIRATORY NEGATIVE: 1
ALLERGIC/IMMUNOLOGIC NEGATIVE: 1
EYES NEGATIVE: 1
PSYCHIATRIC NEGATIVE: 1
ENDOCRINE NEGATIVE: 1
CONSTITUTIONAL NEGATIVE: 1
GASTROINTESTINAL NEGATIVE: 1
HEMATOLOGIC/LYMPHATIC NEGATIVE: 1

## 2025-05-20 ENCOUNTER — APPOINTMENT (OUTPATIENT)
Dept: CARDIOLOGY | Age: 70
End: 2025-05-20

## 2025-05-20 VITALS
SYSTOLIC BLOOD PRESSURE: 132 MMHG | DIASTOLIC BLOOD PRESSURE: 60 MMHG | OXYGEN SATURATION: 98 % | HEIGHT: 68 IN | BODY MASS INDEX: 25.46 KG/M2 | WEIGHT: 168 LBS | RESPIRATION RATE: 18 BRPM | HEART RATE: 56 BPM

## 2025-05-20 DIAGNOSIS — E11.29 DIABETES MELLITUS WITH MICROALBUMINURIA  (CMD): ICD-10-CM

## 2025-05-20 DIAGNOSIS — I10 ESSENTIAL HYPERTENSION, BENIGN: ICD-10-CM

## 2025-05-20 DIAGNOSIS — E11.59 TYPE 2 DIABETES MELLITUS WITH OTHER CIRCULATORY COMPLICATIONS (CMD): ICD-10-CM

## 2025-05-20 DIAGNOSIS — I47.10 PSVT (PAROXYSMAL SUPRAVENTRICULAR TACHYCARDIA) (CMD): Primary | ICD-10-CM

## 2025-05-20 DIAGNOSIS — R80.9 DIABETES MELLITUS WITH MICROALBUMINURIA  (CMD): ICD-10-CM

## 2025-05-20 DIAGNOSIS — E78.2 MIXED HYPERLIPIDEMIA: ICD-10-CM

## 2025-05-20 DIAGNOSIS — I47.10 SVT (SUPRAVENTRICULAR TACHYCARDIA) (CMD): ICD-10-CM

## 2025-05-20 PROBLEM — M48.062 SPINAL STENOSIS OF LUMBAR REGION WITH NEUROGENIC CLAUDICATION: Status: ACTIVE | Noted: 2025-04-08

## 2025-05-20 PROCEDURE — 99214 OFFICE O/P EST MOD 30 MIN: CPT | Performed by: INTERNAL MEDICINE

## 2025-05-20 PROCEDURE — 93000 ELECTROCARDIOGRAM COMPLETE: CPT | Performed by: INTERNAL MEDICINE

## 2025-05-20 ASSESSMENT — ENCOUNTER SYMPTOMS: NUMBNESS: 1

## 2025-05-21 ENCOUNTER — TELEPHONE (OUTPATIENT)
Dept: CARDIOLOGY | Age: 70
End: 2025-05-21

## 2025-05-21 ENCOUNTER — OFFICE VISIT (OUTPATIENT)
Dept: FAMILY MEDICINE CLINIC | Facility: CLINIC | Age: 70
End: 2025-05-21
Payer: MEDICARE

## 2025-05-21 ENCOUNTER — LAB ENCOUNTER (OUTPATIENT)
Dept: LAB | Facility: HOSPITAL | Age: 70
End: 2025-05-21
Attending: FAMILY MEDICINE
Payer: MEDICARE

## 2025-05-21 VITALS
HEIGHT: 69 IN | OXYGEN SATURATION: 97 % | BODY MASS INDEX: 24.14 KG/M2 | WEIGHT: 163 LBS | DIASTOLIC BLOOD PRESSURE: 60 MMHG | HEART RATE: 59 BPM | RESPIRATION RATE: 16 BRPM | TEMPERATURE: 97 F | SYSTOLIC BLOOD PRESSURE: 118 MMHG

## 2025-05-21 DIAGNOSIS — Z01.812 PRE-OPERATIVE LABORATORY EXAMINATION: ICD-10-CM

## 2025-05-21 DIAGNOSIS — L40.9 PSORIASIS: ICD-10-CM

## 2025-05-21 DIAGNOSIS — M48.02 CERVICAL SPINAL STENOSIS: ICD-10-CM

## 2025-05-21 DIAGNOSIS — M48.02 SPINAL STENOSIS OF CERVICAL REGION WITH RADICULOPATHY: ICD-10-CM

## 2025-05-21 DIAGNOSIS — R74.8 ELEVATED ALKALINE PHOSPHATASE LEVEL: ICD-10-CM

## 2025-05-21 DIAGNOSIS — I10 ESSENTIAL HYPERTENSION, BENIGN: ICD-10-CM

## 2025-05-21 DIAGNOSIS — Z01.818 PREOP EXAMINATION: ICD-10-CM

## 2025-05-21 DIAGNOSIS — R73.01 ELEVATED FASTING BLOOD SUGAR: ICD-10-CM

## 2025-05-21 DIAGNOSIS — R77.9 ELEVATED BLOOD PROTEIN: ICD-10-CM

## 2025-05-21 DIAGNOSIS — I47.10 PSVT (PAROXYSMAL SUPRAVENTRICULAR TACHYCARDIA) (HCC): ICD-10-CM

## 2025-05-21 DIAGNOSIS — E11.9 NEW ONSET TYPE 2 DIABETES MELLITUS (HCC): ICD-10-CM

## 2025-05-21 DIAGNOSIS — Z79.01 CURRENT USE OF LONG TERM ANTICOAGULATION: ICD-10-CM

## 2025-05-21 DIAGNOSIS — I87.2 VENOUS INSUFFICIENCY OF BOTH LOWER EXTREMITIES: ICD-10-CM

## 2025-05-21 DIAGNOSIS — E78.49 OTHER HYPERLIPIDEMIA: ICD-10-CM

## 2025-05-21 DIAGNOSIS — K76.0 FATTY LIVER: ICD-10-CM

## 2025-05-21 DIAGNOSIS — G47.33 OSA ON CPAP: ICD-10-CM

## 2025-05-21 DIAGNOSIS — G47.33 OBSTRUCTIVE SLEEP APNEA (ADULT) (PEDIATRIC): ICD-10-CM

## 2025-05-21 DIAGNOSIS — M48.062 SPINAL STENOSIS OF LUMBAR REGION WITH NEUROGENIC CLAUDICATION: Primary | ICD-10-CM

## 2025-05-21 DIAGNOSIS — M54.12 SPINAL STENOSIS OF CERVICAL REGION WITH RADICULOPATHY: ICD-10-CM

## 2025-05-21 LAB
ALBUMIN SERPL-MCNC: 4.9 G/DL (ref 3.2–4.8)
ALBUMIN/GLOB SERPL: 2 {RATIO} (ref 1–2)
ALP LIVER SERPL-CCNC: 67 U/L (ref 45–117)
ALT SERPL-CCNC: 25 U/L (ref 10–49)
ANION GAP SERPL CALC-SCNC: 9 MMOL/L (ref 0–18)
APTT PPP: 31.1 SECONDS (ref 23–36)
AST SERPL-CCNC: 33 U/L (ref ?–34)
BASOPHILS # BLD AUTO: 0.08 X10(3) UL (ref 0–0.2)
BASOPHILS NFR BLD AUTO: 0.8 %
BILIRUB SERPL-MCNC: 0.9 MG/DL (ref 0.2–1.1)
BILIRUB UR QL: NEGATIVE
BUN BLD-MCNC: 20 MG/DL (ref 9–23)
BUN/CREAT SERPL: 25.3 (ref 10–20)
CALCIUM BLD-MCNC: 9.5 MG/DL (ref 8.7–10.4)
CHLORIDE SERPL-SCNC: 102 MMOL/L (ref 98–112)
CLARITY UR: CLEAR
CO2 SERPL-SCNC: 27 MMOL/L (ref 21–32)
CREAT BLD-MCNC: 0.79 MG/DL (ref 0.7–1.3)
CREAT UR-SCNC: 52.3 MG/DL
DEPRECATED RDW RBC AUTO: 44.3 FL (ref 35.1–46.3)
EGFRCR SERPLBLD CKD-EPI 2021: 95 ML/MIN/1.73M2 (ref 60–?)
EOSINOPHIL # BLD AUTO: 0.45 X10(3) UL (ref 0–0.7)
EOSINOPHIL NFR BLD AUTO: 4.7 %
ERYTHROCYTE [DISTWIDTH] IN BLOOD BY AUTOMATED COUNT: 14.1 % (ref 11–15)
EST. AVERAGE GLUCOSE BLD GHB EST-MCNC: 131 MG/DL (ref 68–126)
FASTING STATUS PATIENT QL REPORTED: YES
GLOBULIN PLAS-MCNC: 2.5 G/DL (ref 2–3.5)
GLUCOSE BLD-MCNC: 103 MG/DL (ref 70–99)
GLUCOSE UR-MCNC: >1000 MG/DL
HBA1C MFR BLD: 6.2 % (ref ?–5.7)
HCT VFR BLD AUTO: 42.1 % (ref 39–53)
HGB BLD-MCNC: 13.8 G/DL (ref 13–17.5)
HGB UR QL STRIP.AUTO: NEGATIVE
IMM GRANULOCYTES # BLD AUTO: 0.03 X10(3) UL (ref 0–1)
IMM GRANULOCYTES NFR BLD: 0.3 %
INR BLD: 0.96 (ref 0.8–1.2)
KETONES UR-MCNC: NEGATIVE MG/DL
LEUKOCYTE ESTERASE UR QL STRIP.AUTO: NEGATIVE
LYMPHOCYTES # BLD AUTO: 1.86 X10(3) UL (ref 1–4)
LYMPHOCYTES NFR BLD AUTO: 19.3 %
MCH RBC QN AUTO: 28.2 PG (ref 26–34)
MCHC RBC AUTO-ENTMCNC: 32.8 G/DL (ref 31–37)
MCV RBC AUTO: 86.1 FL (ref 80–100)
MICROALBUMIN UR-MCNC: <0.3 MG/DL
MONOCYTES # BLD AUTO: 1.03 X10(3) UL (ref 0.1–1)
MONOCYTES NFR BLD AUTO: 10.7 %
NEUTROPHILS # BLD AUTO: 6.21 X10 (3) UL (ref 1.5–7.7)
NEUTROPHILS # BLD AUTO: 6.21 X10(3) UL (ref 1.5–7.7)
NEUTROPHILS NFR BLD AUTO: 64.2 %
NITRITE UR QL STRIP.AUTO: NEGATIVE
OSMOLALITY SERPL CALC.SUM OF ELEC: 289 MOSM/KG (ref 275–295)
PH UR: 6.5 [PH] (ref 5–8)
PLATELET # BLD AUTO: 260 10(3)UL (ref 150–450)
POTASSIUM SERPL-SCNC: 4.6 MMOL/L (ref 3.5–5.1)
PROT SERPL-MCNC: 7.4 G/DL (ref 5.7–8.2)
PROT UR-MCNC: NEGATIVE MG/DL
PROTHROMBIN TIME: 13.4 SECONDS (ref 11.6–14.8)
RBC # BLD AUTO: 4.89 X10(6)UL (ref 3.8–5.8)
SODIUM SERPL-SCNC: 138 MMOL/L (ref 136–145)
SP GR UR STRIP: 1.02 (ref 1–1.03)
UROBILINOGEN UR STRIP-ACNC: NORMAL
WBC # BLD AUTO: 9.7 X10(3) UL (ref 4–11)

## 2025-05-21 PROCEDURE — 80053 COMPREHEN METABOLIC PANEL: CPT

## 2025-05-21 PROCEDURE — 85730 THROMBOPLASTIN TIME PARTIAL: CPT

## 2025-05-21 PROCEDURE — 36415 COLL VENOUS BLD VENIPUNCTURE: CPT

## 2025-05-21 PROCEDURE — 99204 OFFICE O/P NEW MOD 45 MIN: CPT | Performed by: FAMILY MEDICINE

## 2025-05-21 PROCEDURE — 99499 UNLISTED E&M SERVICE: CPT | Performed by: FAMILY MEDICINE

## 2025-05-21 PROCEDURE — 83036 HEMOGLOBIN GLYCOSYLATED A1C: CPT

## 2025-05-21 PROCEDURE — 85025 COMPLETE CBC W/AUTO DIFF WBC: CPT

## 2025-05-21 PROCEDURE — 85610 PROTHROMBIN TIME: CPT

## 2025-05-21 PROCEDURE — 82043 UR ALBUMIN QUANTITATIVE: CPT

## 2025-05-21 PROCEDURE — 82570 ASSAY OF URINE CREATININE: CPT

## 2025-05-21 PROCEDURE — 81003 URINALYSIS AUTO W/O SCOPE: CPT

## 2025-05-21 PROCEDURE — 87147 CULTURE TYPE IMMUNOLOGIC: CPT

## 2025-05-21 PROCEDURE — 87081 CULTURE SCREEN ONLY: CPT

## 2025-05-21 NOTE — H&P
[facility] PRE-OP CLINIC [place]    PRE-OP NOTE    HPI:   I have been consulted by Dr. Dung Rivera to see Ananda Perez 70 year old male for a preoperative evaluation and medical clearance. He has a long history of worsening severe degenerative arthritis and lumbar spinal stenosis . Patient is to have a L1-L5 decompression and instrumentation fusion with discectomy by Dr. Rivera on 2025.     Pt suffers significant pain and loss of function.     He has no cardiopulmonary symptoms.      He has no history of  DVT. Denies tobacco use.      He does have HOLLAND      Family History[1]   Current Medications[2]  Past Medical History[3]  Past Surgical History[4]  Social History     Socioeconomic History    Marital status:      Spouse name: Not on file    Number of children: 1    Years of education: Not on file    Highest education level: Not on file   Occupational History    Occupation: [employer]     Employer: [employer]   Tobacco Use    Smoking status: Former     Current packs/day: 0.00     Types: Cigarettes     Quit date: 1981     Years since quittin.4     Passive exposure: Never    Smokeless tobacco: Never   Vaping Use    Vaping status: Never Used   Substance and Sexual Activity    Alcohol use: Yes     Comment: social    Drug use: No    Sexual activity: Not on file   Other Topics Concern     Service Not Asked    Blood Transfusions Not Asked    Caffeine Concern Not Asked    Occupational Exposure Not Asked    Hobby Hazards Not Asked    Sleep Concern Not Asked    Stress Concern Not Asked    Weight Concern Not Asked    Special Diet Not Asked    Back Care Not Asked    Exercise No    Bike Helmet Not Asked    Seat Belt Yes    Self-Exams Not Asked   Social History Narrative    Not on file     Social Drivers of Health     Food Insecurity: Not on file   Transportation Needs: Not on file   Stress: Not on file   Housing Stability: Not on file       REVIEW OF SYSTEMS:   CONSTITUTIONAL:  Denies  unusual weight gain/loss, fever, chills  EENT:  Eyes:  Denies eye pain, visual loss, blurred vision, double vision. Ears, Nose, Throat:  Denies congestion, runny nose or sore throat.  INTEGUMENTARY:  Denies rashes, itching, skin lesion,   CARDIOVASCULAR:  Denies DVT. Denies chest pain, palpitations, edema, dyspnea  RESPIRATORY: He does have  HOLLAND ,Denies shortness of breath, wheezing, cough  GASTROINTESTINAL:  Denies abdominal pain, nausea, vomiting, constipation, diarrhea, or blood in stool.  MUSCULOSKELETAL: severe pain to his low back radiating to his legs as noted above  NEUROLOGICAL:  Denies headache, seizures, dizziness, syncope, paralysis, ataxia,  HEMATOLOGIC:  Denies anemia, bleeding or bruising.  LYMPHATICS:  Denies enlarged nodes   PSYCHIATRIC:  Denies depression or anxiety.  ENDOCRINOLOGIC: DM 2 YES,   ALLERGIES:  Denies allergic response, history of asthma, hives,     EXAM:   /60 (BP Location: Left arm)   Pulse 59   Temp 97 °F (36.1 °C) (Temporal)   Resp 16   Ht 5' 9\" (1.753 m)   Wt 163 lb (73.9 kg)   SpO2 97%   BMI 24.07 kg/m²  Estimated body mass index is 24.07 kg/m² as calculated from the following:    Height as of this encounter: 5' 9\" (1.753 m).    Weight as of this encounter: 163 lb (73.9 kg).   Vital signs reviewed.Appears stated age, well groomed.  Physical Exam:  GEN:  Patient is alert, awake and oriented, well developed, well nourished, no apparent distress.  HEENT:  Head:  Normocephalic, atraumatic  Nose: patent, no nasal discharge  NECK: Supple, no CLAD, no carotid bruit, no thyromegaly.  SKIN: No rashes, no skin lesion, no bruising, good turgor.  HEART:  Regular rate and rhythm, no murmurs, rubs or gallops.  LUNGS: Clear to auscultation bilterally, no rales/rhonchi/wheezing.  CHEST: No tenderness.  ABDOMEN:  Soft, nondistended, nontender,  no masses, no hepatosplenomegaly.  BACK: low lumbar tenderness bilaterally    EXTREMITIES:  No edema, no cyanosis, no clubbing,   NEURO:  No  focal deficit, speech fluent, he walks with an antalgic gait, strength and tone are weak bilaterally  , sensory intact      Lab Results   Component Value Date    WBC 9.7 05/21/2025    HGB 13.8 05/21/2025    HCT 42.1 05/21/2025    .0 05/21/2025    CREATSERUM 0.79 05/21/2025    BUN 20 05/21/2025     05/21/2025    K 4.6 05/21/2025     05/21/2025    CO2 27.0 05/21/2025     (H) 05/21/2025    CA 9.5 05/21/2025    ALB 4.9 (H) 05/21/2025    ALKPHO 67 05/21/2025    BILT 0.9 05/21/2025    TP 7.4 05/21/2025    AST 33 05/21/2025    ALT 25 05/21/2025    PTT 31.1 05/21/2025    INR 0.96 05/21/2025    T4F 0.8 (L) 06/01/2011    TSH 1.480 12/01/2020     12/13/2014    PSA 2.06 12/01/2020    BNP 36 06/01/2011    MG 2.3 04/30/2018    PHOS 3.1 06/01/2011    TROP <0.046 12/13/2014       No results found.          ASSESSMENT AND PLAN:   Ananda Perez is a 70 year old male, with a hx of severe degenerative arthritis and lumbar spinal stenosis  who presents for a pre-operative physical exam. Patient is to have a L1-L5 decompression with instrumentation and fusion with discectomy by Dr. Dung Rivera  on 5/27/2025.      ICD-10-CM    1. Spinal stenosis of lumbar region with neurogenic claudication  M48.062       2. Spinal stenosis of cervical region with radiculopathy S/P C3-C6 ACDF  M48.02     M54.12       3. PSVT (paroxysmal supraventricular tachycardia) (HCC)  I47.10       4. Venous insufficiency of both lower extremities  I87.2       5. New onset type 2 diabetes mellitus (HCC)  E11.9       6. Other hyperlipidemia  E78.49       7. Essential hypertension, benign  I10       8. Cervical spinal stenosis  M48.02       9. Fatty liver  K76.0       10. Psoriasis  L40.9       11. Obstructive sleep apnea (adult) (pediatric)  G47.33       12. Elevated blood protein  R77.9       13. Elevated alkaline phosphatase level  R74.8       14. HOLLAND on CPAP  G47.33       15. Elevated fasting blood sugar  R73.01 CBC With  Differential With Platelet     Comp Metabolic Panel (14)     Hemoglobin A1C     MSSA and MRSA Culture Screen     Urinalysis with Culture Reflex     PTT, Activated     Prothrombin Time (PT)     Microalb/Creat Ratio, Random Urine [E]      16. Current use of long term anticoagulation  Z79.01 CBC With Differential With Platelet     Comp Metabolic Panel (14)     Hemoglobin A1C     MSSA and MRSA Culture Screen     Urinalysis with Culture Reflex     PTT, Activated     Prothrombin Time (PT)      17. Preop examination  Z01.818 CBC With Differential With Platelet     Comp Metabolic Panel (14)     Hemoglobin A1C     MSSA and MRSA Culture Screen     Urinalysis with Culture Reflex     PTT, Activated     Prothrombin Time (PT)     Microalb/Creat Ratio, Random Urine [E]      18. Pre-operative laboratory examination  Z01.812 CBC With Differential With Platelet     Comp Metabolic Panel (14)     Hemoglobin A1C     MSSA and MRSA Culture Screen     Urinalysis with Culture Reflex     PTT, Activated     Prothrombin Time (PT)     Microalb/Creat Ratio, Random Urine [E]        Problem List[5]     ECG and labs are pending review     Preoperative Risk Stratification: There are no decompensated medical conditions. ASA classification 2    Medical history:  High risk surgery (vascular, thoracic, intra-peritoneal): No  CAD: No  CHF: No  Stroke: No  DM on insulin: No  Serum Creatinine >2 mg/dl: No  Patient has an RCRI score that is moderate risk and is at moderate risk for major cardiac event in the perioperative period.     Patient is medically optimized and has an acceptable risk of surgery and may proceed with surgery as planned.     PLAN:    Cardiology clearance     Patient to discontinue medications and supplements with anticoagulation properties as per instruction.        Postoperative Recommendations:    Anticoagulation / DVT prophylaxis: SCDs, early ambulation.   GI protection: Protonix  Incentive Spirometry   Telemetry as needed  CPAP/O2  as needed   DM: QID glucoscans and sliding scale insulin as needed   Renal protection: (hydration / NSAID and ACE/ARB avoidance)   Cognitive protection: (minimize narcotics, benzodiazepines, scopolamine)     Pain management and Physical therapy as per Orthopedic service.   Home Health as needed    Thank you for the opportunity to care for your patient and to assist in managing the postoperative course.        Valeriy Malhotra, DO  5/21/2025  4:05 PM         [1]   Family History  Problem Relation Age of Onset    Hypertension Father     Heart Disorder Father     Hypertension Mother     Heart Disorder Mother     Other (HOLLAND) Brother    [2]   Current Outpatient Medications   Medication Sig Dispense Refill    lisinopril 5 MG Oral Tab Take 1 tablet (5 mg total) by mouth in the morning.      JARDIANCE 25 MG Oral Tab Take 1 tablet (25 mg total) by mouth in the morning.      Microlet Lancets Does not apply Misc Check BG  2 times daily 100 each 6    atorvastatin 20 MG Oral Tab Take 1 tablet (20 mg total) by mouth in the morning.      Glucose Blood (CONTOUR NEXT TEST) In Vitro Strip Test BID dx E11.9 180 each 3    Blood Glucose Monitoring Suppl (CONTOUR NEXT MONITOR) w/Device Does not apply Kit 1 each by Does not apply route 2 (two) times a day. dx E11.9 1 kit 0    EZETIMIBE 10 MG Oral Tab TAKE 1 TABLET BY MOUTH DAILY 90 tablet 0    metoprolol Tartrate 25 MG Oral Tab Take 1 tablet (25 mg total) by mouth 2 (two) times daily. 180 tablet 3    Levocetirizine Dihydrochloride 5 MG Oral Tab Take 1 tablet (5 mg total) by mouth in the morning.      Turmeric 500 MG Oral Cap Take 1 capsule by mouth in the morning.      omega-3 fatty acids (FISH OIL) 1000 MG Oral Cap Take 1,000 mg by mouth in the morning.      aspirin 81 MG Oral Tab EC Take 1 tablet (81 mg total) by mouth daily. (Patient not taking: Reported on 5/21/2025)      digoxin 0.25 MG Oral Tab Take 1 tablet (0.25 mg total) by mouth daily. 90 tablet 3   [3]   Past Medical History:    Back problem    cervical spine    Diabetes (HCC)    Type 2    Fatty liver    resolved    High blood pressure    High cholesterol    OTHER DISEASES    tachycardia    Psoriasis    Worse in the winter    PSVT (paroxysmal supraventricular tachycardia) (HCC)    Sleep apnea    AHI 45 RDI 47 REM AHI 0 Supine AHI 45 non-supine AHI 0.0 Sao2 Manjinder 90%    Spinal stenosis    Visual impairment    reading glasses   [4]   Past Surgical History:  Procedure Laterality Date    Back surgery  04/30/2018    C3-C6 ACDF     Cardiac catheterization  1997    No coronary lesions   [5]   Patient Active Problem List  Diagnosis    Psoriasis (a type of skin inflammation)    Other hyperlipidemia    Essential hypertension, benign    PSVT (paroxysmal supraventricular tachycardia) (HCC)    Psoriasis    Obstructive sleep apnea (adult) (pediatric)    Fatty liver    Cervical spinal stenosis    Elevated blood protein    Elevated alkaline phosphatase level    Abnormal CT of brain    New onset type 2 diabetes mellitus (HCC)    Spinal stenosis of lumbar region with neurogenic claudication

## 2025-05-22 ENCOUNTER — RESULTS FOLLOW-UP (OUTPATIENT)
Dept: CARDIOLOGY | Age: 70
End: 2025-05-22

## 2025-05-23 RX ORDER — MUPIROCIN 2 %
OINTMENT (GRAM) TOPICAL
Qty: 22 G | Refills: 0 | Status: SHIPPED | OUTPATIENT
Start: 2025-05-23 | End: 2025-06-17

## 2025-05-27 PROCEDURE — 22612 ARTHRD PST TQ 1NTRSPC LUMBAR: CPT | Performed by: ORTHOPAEDIC SURGERY

## 2025-05-27 PROCEDURE — 63030 LAMOT DCMPRN NRV RT 1 LMBR: CPT | Performed by: PHYSICIAN ASSISTANT

## 2025-05-27 PROCEDURE — 22614 ARTHRD PST TQ 1NTRSPC EA ADD: CPT | Performed by: ORTHOPAEDIC SURGERY

## 2025-05-27 PROCEDURE — 63030 LAMOT DCMPRN NRV RT 1 LMBR: CPT | Performed by: ORTHOPAEDIC SURGERY

## 2025-05-27 PROCEDURE — 22614 ARTHRD PST TQ 1NTRSPC EA ADD: CPT | Performed by: PHYSICIAN ASSISTANT

## 2025-05-27 PROCEDURE — 63048 LAM FACETEC &FORAMOT EA ADDL: CPT | Performed by: PHYSICIAN ASSISTANT

## 2025-05-27 PROCEDURE — 63047 LAM FACETEC & FORAMOT LUMBAR: CPT | Performed by: ORTHOPAEDIC SURGERY

## 2025-05-27 PROCEDURE — 63048 LAM FACETEC &FORAMOT EA ADDL: CPT | Performed by: ORTHOPAEDIC SURGERY

## 2025-05-27 PROCEDURE — 22612 ARTHRD PST TQ 1NTRSPC LUMBAR: CPT | Performed by: PHYSICIAN ASSISTANT

## 2025-05-27 PROCEDURE — 63047 LAM FACETEC & FORAMOT LUMBAR: CPT | Performed by: PHYSICIAN ASSISTANT

## 2025-05-28 DIAGNOSIS — M43.17 SPONDYLOLISTHESIS OF LUMBOSACRAL REGION: ICD-10-CM

## 2025-05-28 DIAGNOSIS — M54.16 LUMBAR RADICULOPATHY: Primary | ICD-10-CM

## 2025-05-28 DIAGNOSIS — M51.26 HNP (HERNIATED NUCLEUS PULPOSUS), LUMBAR: ICD-10-CM

## 2025-05-28 DIAGNOSIS — M48.062 SPINAL STENOSIS OF LUMBAR REGION WITH NEUROGENIC CLAUDICATION: ICD-10-CM

## 2025-05-28 DIAGNOSIS — M41.50 SCOLIOSIS DUE TO DEGENERATIVE DISEASE OF SPINE IN ADULT PATIENT: ICD-10-CM

## 2025-05-28 NOTE — PROCEDURES
VIRGINIA RIVERA MD, Spine Section [facility]  Operative Note         Ananda Perez Location: OR   [facility] [id number] MRN [id number]   Admission Date DATE OF SURG Operation Date  5/27/2025   Attending Physician VIRGINIA Rivera MD    NPI [id number]      Patient Name: Ananda Perez     Preoperative Diagnosis: L1-2, L2-3, L3-4, L4-5, and L5-S1     ICD-10-CM   1. Lumbar radiculopathy  M54.16   2. Spinal stenosis of lumbar region with neurogenic claudication  M48.062   3. Spondylolisthesis of lumbosacral region  M43.17   4. HNP (herniated nucleus pulposus), lumbar  M51.26   5. Scoliosis due to degenerative disease of spine in adult patient  M41.50        Postoperative Diagnosis:SAME    Procedure Performed: L1-2, L2-3, L3-4, L4-5, and L5-S1 Posterior decompression uninstrumented fusion, L1-L2 discectomy      Primary Surgeon: VIRGINIA Rivera MD     Assistant: Carl Keyes PA-C (NPI #[id number])  The execution of this spinal procedure necessitated the continuous presence and skilled participation of an experienced surgical assistant throughout its entirety. A substantial portion of the surgical work directly involves decompression and manipulation in and around the delicate nerves of the cauda equina. Consequently, any form of intraoperative assistance, encompassing but not limited to precise and sustained retraction of neural  structures to maintain a safe working corridor, meticulous suctioning to ensure a clear view of the anatomy, and other means of facilitating the technical demands of the procedure, mandates the involvement of an individual possessing substantial postgraduate training in spine surgery and extensive, hands-on experience in complex spinal procedures. Such a skilled assistant's contribution is paramount to optimizing patient safety and ensuring the successful completion of the reconstruction.    Anesthesia: GET      Estimated Blood Loss: 100cc  Complications: None     Indications for  procedure: Ananda Perez  presents with severe and persistent symptoms in the lumbar spine, detailed in Ananda Perez's clinical notes. They are directly attributable to the diagnoses outlined above.. These debilitating symptoms have not abated despite the documented completion of extensive and appropriate non-operative management as detailed in the patient's comprehensive clinic record. This included, but was not limited to, trials of physical therapy, various medical therapies, and targeted injections over a significant duration. Conservative measures have failed to provide adequate relief or prevent the progression of symptoms.    Advanced imaging studies correlate precisely with the patient's clinical presentation. The persistence of severe pain and/or neurological deficits despite exhausted conservative options necessitates surgical intervention to decompress the compromised neural elements.    Based on the clinical presentation, failed conservative management, and objective imaging findings, posterior lumbar decompression with possible uninstrumented fusion is indicated in accordance with scientific literature and evidence-based guidelines. The primary goal is neural decompression to alleviate the Ananda Perez's debilitating symptoms. Uninstrumented fusion may be performed if intraoperative assessment reveals significant instability or if instability is a necessary consequence of adequate decompression.    Preoperative Discussion: A thorough discussion regarding the surgical plan, including the posterior lumbar decompression and the possibility of uninstrumented fusion, was conducted with Ananda Perez. The potential risks and benefits of this procedure were reviewed in detail, including but not limited to, bleeding, infection, cerebrospinal fluid leak, nerve root injury, potential for persistent pain or neurological deficits, pseudarthrosis (failure of fusion if performed), and the  potential need for further surgery. The procedure and relevant anatomy were explained using models, diagrams, and the patient's specific imaging. Additional written and digital resources were provided. No guarantees regarding the surgical outcome were made. The patient expressed understanding of these points and provided informed consent for the posterior lumbar decompression and possible uninstrumented fusion.    Surgical Findings: See Below     Complexity: Severe    Description of Procedure:      After a thorough discussion regarding the risks, benefits, and alternatives of the proposed surgical intervention, and having obtained duly executed informed consent, the patient was transported to the operating room suite. Prophylactic sequential compression devices (SCDs) were meticulously applied to the lower extremities to mitigate the risk of deep vein thrombosis in the perioperative period. Following the induction of general anesthesia, weight-appropriate and broad-spectrum preoperative antibiotics were administered intravenously, ensuring adequate tissue penetration prior to surgical incision.    The patient was carefully positioned in the prone orientation on a specialized Nabil frame, designed to optimize lumbar flexion and minimize epidural venous engorgement, thereby facilitating surgical access and potentially reducing intraoperative blood loss. All vulnerable bony prominences, including the ????ae, knees, and feet, were conscientiously padded to prevent pressure-related complications throughout the procedure.    The anatomical landmarks corresponding to the planned surgical levels were precisely identified and marked on the skin surface. Utilizing x-ray guidance, a fine-gauge spinal needle was advanced to the targeted spinal level to confirm accurate localization of the intended skin incision. This critical step ensured that the surgical approach would directly align with the underlying spinal pathology.  Following radiographic confirmation of the appropriate operative levels, the needle was withdrawn. A generous infiltration of a carefully prepared mixture of local anesthetic, specifically intended to provide targeted regional analgesia, was then meticulously administered into the paraspinal musculature and surrounding soft tissues spanning the operative levels. This strategic injection aimed to proactively minimize postoperative pain and discomfort, thereby enhancing the patient's recovery trajectory and potentially reducing the need for systemic analgesics.The patient's back was then meticulously prepared using a multi-stage sterile scrub and paint technique, followed by the application of sterile drapes in a standard fashion, creating a sterile operative field.    A sharply incised midline longitudinal skin incision was created with a #10 surgical blade, carefully carried down through the dermis and subcutaneous tissues. Meticulous hemostasis within the subcutaneous layer and superficial fascia was achieved utilizing Bovie electrocautery, precisely coagulating bleeding vessels to maintain a clear surgical field. Dissection was systematically carried down through the subcutaneous fat and lumbar fascia to expose the dorsal spinal anatomy. A precise subperiosteal dissection technique was then meticulously employed at the level of the posterior elements, carefully elevating the paraspinal musculature from the spinous processes, laminae, and facet joints. This dissection remained strictly subperiosteal, preserving the integrity of the facet joint capsules throughout the operative levels addressed. This posterior exposure was meticulously performed across all vertebral segments designated for surgical intervention, providing clear visualization of the underlying bony structures. Self-retaining retractors were subsequently carefully inserted and expanded to gently retract the elevated paraspinal muscles, maintaining  optimal exposure of the surgical field for the duration of the procedure.           Our attention then precisely transitioned to the designated lumbar levels, specifically L5-S1, where the pathology necessitating decompression was located. Delicately, curettes of appropriate size and curvature were carefully employed to initiate access into the spinal canal by carefully removing soft tissue and initiating a fenêtre in the ligamentum flavum. Aneurysm dissectors, commonly referred to as a Flannery ball, were then skillfully utilized to meticulously and gently dissect the thickened ligamentum flavum away and free from the underlying delicate neural elements, including the dura and nerve roots. Subsequently, calibrated Kerrison rongeurs, specifically 4 mm and 5 mm sizes, were precisely manipulated to resect the hypertrophied ligamentum flavum and address the central canal stenosis impinging upon the spinal cord and cauda equina. This included the careful removal of bone from the cranial and distal aspects of the lamina to effectively decompress the central canal.    To ensure complete neural decompression, both the cranial and caudal nerve roots at these levels were systematically explored bilaterally. This was achieved by meticulously elevating a surgical plane using the Flannery ball dissector and fine curettes, carefully  the adherent ligamentum flavum from the dural sac. With the dura mater gently dissected free and protected inferiorly, the interface between the ligamentum flavum and the neural elements was clearly identified. The thickened ligamentum flavum and any associated bony stenosis in this region were then meticulously removed using a graduated series of 3 mm, 4 mm, and 5 mm Kerrison rongeurs.    Attention was then directed to the neural foramina at this level, addressing potential compression of the exiting cranial and caudal nerve roots. Contralateral decompression was concurrently undertaken  utilizing an undercutting technique, effectively extending the decompression across the midline to ensure bilateral relief of neural element compression. This comprehensive approach created a bilateral decompression encompassing bilateral microforaminotomy and hemilaminotomy. An undercutting, facet-sparing hemilaminectomy was also precisely completed. By employing this undercutting technique with the Flannery ball maintaining protection of the dura, we were able to effectively excise ligamentum flavum and encroaching bony stenosis at both the cranial and distal aspects of the neural foramina as required to achieve optimal decompression of the cranial and caudal neural elements. This was performed in a controlled, undercutting fashion to carefully remove compressive pathology while preserving the integrity of the facet joints. This meticulous technique facilitated a thorough examination and decompression of both the central and contralateral neural elements, ensuring comprehensive bilateral decompression. Upon the conclusion of the decompression phase, direct visualization confirmed that all neural elements were free from any residual compression throughout the operative zone.  (LUMBAR DECOMPRESSION INCLUDING PROXIMAL AND DISTAL LAMINA AND NERVE ROOTS CPT CODE 74196, 80279)              Given the extent of the necessary decompression, which required the substantial removal of portions of the facet joints and associated posterior bony structures to adequately relieve neural impingement, there was significant concern for iatrogenic spinal instability at this segment. Due to this assessment of potential instability, we then elected to proceed with a posterolateral arthrodesis (fusion) at this level to provide necessary stability and promote long-term segmental rigidity. To prepare the fusion bed, the remaining posterior elements, including the residual lamina and a substantial portion of the intraarticular facets, were  meticulously decorticated using a high-speed aubree and osteotomes. This process involved removing the outer cortical bone layer to expose the underlying cancellous bone, a critical step to stimulate bleeding and promote a robust healing response conducive to bony fusion. Subsequently, local morselized autograft bone, harvested directly from the operative field (e.g., excess lamina or spinous process removed during decompression), was carefully prepared and generously applied to the decorticated surfaces of the prepared facet joints and other posterior elements.   (POSTEROLATERAL FUSION CPT CODE 85894)                OUR ATTENTION TURNED TO THE  L4-5  level. We first carefully removed portions of the interspinous ligament and the tips of the spinous processes to enhance access to the underlying lamina and ligamentum flavum. Delicately, curettes of appropriate size and curvature were carefully employed to initiate access into the spinal canal by carefully removing soft tissue and initiating a fenestration in the ligamentum flavum. Aneurysm dissectors, commonly referred to as a Flannery ball, were then skillfully utilized to meticulously and gently dissect the thickened ligamentum flavum away and free from the underlying delicate neural elements, including the dura and nerve roots. Subsequently, calibrated Kerrison rongeurs, specifically 4 mm and 5 mm sizes, were precisely manipulated to resect the hypertrophied ligamentum flavum and address the central canal stenosis impinging upon the spinal cord and cauda equina. This included the careful removal of bone from the cranial and distal aspects of the lamina to effectively decompress the central canal. To ensure complete neural decompression, both the cranial and caudal nerve roots at these levels were systematically explored bilaterally. This was achieved by meticulously elevating a surgical plane using the Flannery ball dissector and fine curettes, carefully  the  adherent ligamentum flavum from the dural sac. With the dura mater gently dissected free and protected inferiorly, the interface between the ligamentum flavum and the neural elements was clearly identified. The thickened ligamentum flavum and any associated bony stenosis in this region were then meticulously removed using a graduated series of 3 mm, 4 mm, and 5 mm Kerrison rongeurs. Attention was then directed to the neural foramina at this level, addressing potential compression of the exiting cranial and caudal nerve roots. Contralateral decompression was concurrently undertaken utilizing an undercutting technique, effectively extending the decompression across the midline to ensure bilateral relief of neural element compression. This comprehensive approach created a bilateral decompression encompassing bilateral microforaminotomy and hemilaminotomy. An undercutting, facet-sparing hemilaminectomy was also precisely completed. By employing this undercutting technique with the Flannery ball maintaining protection of the dura, we were able to effectively excise ligamentum flavum and encroaching bony stenosis at both the cranial and distal aspects of the neural foramina as required to achieve optimal decompression of the cranial and caudal neural elements. This was performed in a controlled, undercutting fashion to carefully remove compressive pathology while preserving the integrity of the facet joints. This meticulous technique facilitated a thorough examination and decompression of both the central and contralateral neural elements, ensuring comprehensive bilateral decompression. Upon the conclusion of the decompression phase, direct visualization confirmed that all neural elements were free from any residual compression throughout the operative zone. (LUMBAR DECOMPRESSION REMOVAL OF LAMINA ADDITIONAL LEVEL CPT CODE 13937)           Given the extent of the necessary decompression, which required the substantial  removal of portions of the facet joints and associated posterior bony structures to adequately relieve neural impingement, there was significant concern for iatrogenic spinal instability at this segment. Due to this assessment of potential instability, we then elected to proceed with a posterolateral arthrodesis (fusion) at this level to provide necessary stability and promote long-term segmental rigidity. To prepare the fusion bed, the remaining posterior elements, including the residual lamina and a substantial portion of the intraarticular facets, were meticulously decorticated using a high-speed aubree and osteotomes. This process involved removing the outer cortical bone layer to expose the underlying cancellous bone, a critical step to stimulate bleeding and promote a robust healing response conducive to bony fusion. Subsequently, local morselized autograft bone, harvested directly from the operative field (e.g., excess lamina or spinous process removed during decompression), was carefully prepared and generously applied to the decorticated surfaces of the prepared facet joints and other posterior elements.    (POSTEROLATERAL FUSION ADDITIONAL LEVEL CPT CODE 15503)                 OUR ATTENTION TURNED TO THE  L3-4  level. We first carefully removed portions of the interspinous ligament and the tips of the spinous processes to enhance access to the underlying lamina and ligamentum flavum. Delicately, curettes of appropriate size and curvature were carefully employed to initiate access into the spinal canal by carefully removing soft tissue and initiating a fenestration in the ligamentum flavum. Aneurysm dissectors, commonly referred to as a Flannery ball, were then skillfully utilized to meticulously and gently dissect the thickened ligamentum flavum away and free from the underlying delicate neural elements, including the dura and nerve roots. Subsequently, calibrated Kerrison rongeurs, specifically 4 mm and 5 mm  sizes, were precisely manipulated to resect the hypertrophied ligamentum flavum and address the central canal stenosis impinging upon the spinal cord and cauda equina. This included the careful removal of bone from the cranial and distal aspects of the lamina to effectively decompress the central canal. To ensure complete neural decompression, both the cranial and caudal nerve roots at these levels were systematically explored bilaterally. This was achieved by meticulously elevating a surgical plane using the Flannery ball dissector and fine curettes, carefully  the adherent ligamentum flavum from the dural sac. With the dura mater gently dissected free and protected inferiorly, the interface between the ligamentum flavum and the neural elements was clearly identified. The thickened ligamentum flavum and any associated bony stenosis in this region were then meticulously removed using a graduated series of 3 mm, 4 mm, and 5 mm Kerrison rongeurs. Attention was then directed to the neural foramina at this level, addressing potential compression of the exiting cranial and caudal nerve roots. Contralateral decompression was concurrently undertaken utilizing an undercutting technique, effectively extending the decompression across the midline to ensure bilateral relief of neural element compression. This comprehensive approach created a bilateral decompression encompassing bilateral microforaminotomy and hemilaminotomy. An undercutting, facet-sparing hemilaminectomy was also precisely completed. By employing this undercutting technique with the Flannery ball maintaining protection of the dura, we were able to effectively excise ligamentum flavum and encroaching bony stenosis at both the cranial and distal aspects of the neural foramina as required to achieve optimal decompression of the cranial and caudal neural elements. This was performed in a controlled, undercutting fashion to carefully remove compressive  pathology while preserving the integrity of the facet joints. This meticulous technique facilitated a thorough examination and decompression of both the central and contralateral neural elements, ensuring comprehensive bilateral decompression. Upon the conclusion of the decompression phase, direct visualization confirmed that all neural elements were free from any residual compression throughout the operative zone. (LUMBAR DECOMPRESSION REMOVAL OF LAMINA ADDITIONAL LEVEL CPT CODE 07242)           Given the extent of the necessary decompression, which required the substantial removal of portions of the facet joints and associated posterior bony structures to adequately relieve neural impingement, there was significant concern for iatrogenic spinal instability at this segment. Due to this assessment of potential instability, we then elected to proceed with a posterolateral arthrodesis (fusion) at this level to provide necessary stability and promote long-term segmental rigidity. To prepare the fusion bed, the remaining posterior elements, including the residual lamina and a substantial portion of the intraarticular facets, were meticulously decorticated using a high-speed aubree and osteotomes. This process involved removing the outer cortical bone layer to expose the underlying cancellous bone, a critical step to stimulate bleeding and promote a robust healing response conducive to bony fusion. Subsequently, local morselized autograft bone, harvested directly from the operative field (e.g., excess lamina or spinous process removed during decompression), was carefully prepared and generously applied to the decorticated surfaces of the prepared facet joints and other posterior elements.    (POSTEROLATERAL FUSION ADDITIONAL LEVEL CPT CODE 69176)                 OUR ATTENTION TURNED TO THE  L2-3  level. We first carefully removed portions of the interspinous ligament and the tips of the spinous processes to enhance access  to the underlying lamina and ligamentum flavum. Delicately, curettes of appropriate size and curvature were carefully employed to initiate access into the spinal canal by carefully removing soft tissue and initiating a fenestration in the ligamentum flavum. Aneurysm dissectors, commonly referred to as a Flannery ball, were then skillfully utilized to meticulously and gently dissect the thickened ligamentum flavum away and free from the underlying delicate neural elements, including the dura and nerve roots. Subsequently, calibrated Kerrison rongeurs, specifically 4 mm and 5 mm sizes, were precisely manipulated to resect the hypertrophied ligamentum flavum and address the central canal stenosis impinging upon the spinal cord and cauda equina. This included the careful removal of bone from the cranial and distal aspects of the lamina to effectively decompress the central canal. To ensure complete neural decompression, both the cranial and caudal nerve roots at these levels were systematically explored bilaterally. This was achieved by meticulously elevating a surgical plane using the Flannery ball dissector and fine curettes, carefully  the adherent ligamentum flavum from the dural sac. With the dura mater gently dissected free and protected inferiorly, the interface between the ligamentum flavum and the neural elements was clearly identified. The thickened ligamentum flavum and any associated bony stenosis in this region were then meticulously removed using a graduated series of 3 mm, 4 mm, and 5 mm Kerrison rongeurs. Attention was then directed to the neural foramina at this level, addressing potential compression of the exiting cranial and caudal nerve roots. Contralateral decompression was concurrently undertaken utilizing an undercutting technique, effectively extending the decompression across the midline to ensure bilateral relief of neural element compression. This comprehensive approach created a  bilateral decompression encompassing bilateral microforaminotomy and hemilaminotomy. An undercutting, facet-sparing hemilaminectomy was also precisely completed. By employing this undercutting technique with the Flannery ball maintaining protection of the dura, we were able to effectively excise ligamentum flavum and encroaching bony stenosis at both the cranial and distal aspects of the neural foramina as required to achieve optimal decompression of the cranial and caudal neural elements. This was performed in a controlled, undercutting fashion to carefully remove compressive pathology while preserving the integrity of the facet joints. This meticulous technique facilitated a thorough examination and decompression of both the central and contralateral neural elements, ensuring comprehensive bilateral decompression. Upon the conclusion of the decompression phase, direct visualization confirmed that all neural elements were free from any residual compression throughout the operative zone. (LUMBAR DECOMPRESSION REMOVAL OF LAMINA ADDITIONAL LEVEL CPT CODE 01077)           Given the extent of the necessary decompression, which required the substantial removal of portions of the facet joints and associated posterior bony structures to adequately relieve neural impingement, there was significant concern for iatrogenic spinal instability at this segment. Due to this assessment of potential instability, we then elected to proceed with a posterolateral arthrodesis (fusion) at this level to provide necessary stability and promote long-term segmental rigidity. To prepare the fusion bed, the remaining posterior elements, including the residual lamina and a substantial portion of the intraarticular facets, were meticulously decorticated using a high-speed aubree and osteotomes. This process involved removing the outer cortical bone layer to expose the underlying cancellous bone, a critical step to stimulate bleeding and promote a  robust healing response conducive to bony fusion. Subsequently, local morselized autograft bone, harvested directly from the operative field (e.g., excess lamina or spinous process removed during decompression), was carefully prepared and generously applied to the decorticated surfaces of the prepared facet joints and other posterior elements.    (POSTEROLATERAL FUSION ADDITIONAL LEVEL CPT CODE 90135)                 OUR ATTENTION TURNED TO THE  L1-2  level. We first carefully removed portions of the interspinous ligament and the tips of the spinous processes to enhance access to the underlying lamina and ligamentum flavum. Delicately, curettes of appropriate size and curvature were carefully employed to initiate access into the spinal canal by carefully removing soft tissue and initiating a fenestration in the ligamentum flavum. Aneurysm dissectors, commonly referred to as a Flannery ball, were then skillfully utilized to meticulously and gently dissect the thickened ligamentum flavum away and free from the underlying delicate neural elements, including the dura and nerve roots. Subsequently, calibrated Kerrison rongeurs, specifically 4 mm and 5 mm sizes, were precisely manipulated to resect the hypertrophied ligamentum flavum and address the central canal stenosis impinging upon the spinal cord and cauda equina. This included the careful removal of bone from the cranial and distal aspects of the lamina to effectively decompress the central canal. To ensure complete neural decompression, both the cranial and caudal nerve roots at these levels were systematically explored bilaterally. This was achieved by meticulously elevating a surgical plane using the Flannery ball dissector and fine curettes, carefully  the adherent ligamentum flavum from the dural sac. With the dura mater gently dissected free and protected inferiorly, the interface between the ligamentum flavum and the neural elements was clearly  identified. The thickened ligamentum flavum and any associated bony stenosis in this region were then meticulously removed using a graduated series of 3 mm, 4 mm, and 5 mm Kerrison rongeurs. Attention was then directed to the neural foramina at this level, addressing potential compression of the exiting cranial and caudal nerve roots. Contralateral decompression was concurrently undertaken utilizing an undercutting technique, effectively extending the decompression across the midline to ensure bilateral relief of neural element compression. This comprehensive approach created a bilateral decompression encompassing bilateral microforaminotomy and hemilaminotomy. An undercutting, facet-sparing hemilaminectomy was also precisely completed. By employing this undercutting technique with the Flannery ball maintaining protection of the dura, we were able to effectively excise ligamentum flavum and encroaching bony stenosis at both the cranial and distal aspects of the neural foramina as required to achieve optimal decompression of the cranial and caudal neural elements. This was performed in a controlled, undercutting fashion to carefully remove compressive pathology while preserving the integrity of the facet joints. This meticulous technique facilitated a thorough examination and decompression of both the central and contralateral neural elements, ensuring comprehensive bilateral decompression. Upon the conclusion of the decompression phase, direct visualization confirmed that all neural elements were free from any residual compression throughout the operative zone. We did identify a disc herniation on the right. We mobilized the L2 nerve root and with nerve retractor in place incised into the herniation with an 11 blade. It was then removed with a peapod rongeur. Much of the herniation was calcified. (LUMBAR DECOMPRESSION DISCECTOMY  CPT CODE 70571)           Given the extent of the necessary decompression, which required  the substantial removal of portions of the facet joints and associated posterior bony structures to adequately relieve neural impingement, there was significant concern for iatrogenic spinal instability at this segment. Due to this assessment of potential instability, we then elected to proceed with a posterolateral arthrodesis (fusion) at this level to provide necessary stability and promote long-term segmental rigidity. To prepare the fusion bed, the remaining posterior elements, including the residual lamina and a substantial portion of the intraarticular facets, were meticulously decorticated using a high-speed aubree and osteotomes. This process involved removing the outer cortical bone layer to expose the underlying cancellous bone, a critical step to stimulate bleeding and promote a robust healing response conducive to bony fusion. Subsequently, local morselized autograft bone, harvested directly from the operative field (e.g., excess lamina or spinous process removed during decompression), was carefully prepared and generously applied to the decorticated surfaces of the prepared facet joints and other posterior elements.    (POSTEROLATERAL FUSION ADDITIONAL LEVEL CPT CODE 69823)     Final x-ray was taken to confirm appropriate levels were addressed. The wound was thoroughly irrigated.  Hemostasis was maintained.  FloSeal was applied to help with hemostasis and then was irrigated away. Valsalva maneuver confirmed that there was no spinal leak.  Duragen was applied, subfascial drain was applied.  The fascia was closed in water-tight fashion with figure-of-eight 1-0 Vicryl; 2-0 Vicryl was used for the subcutaneous tissues and running 3-0 subcuticular stitches were applied.  Steri-Strips were applied.  Sterile dressings were applied.      Drains: to bulb suction     Condition: The patient was extubated and taken to the recovery room in stable condition and was neurologically intact at its baseline and stable on  arrival.  Needle and Ray-Thanh counts were correct at the conclusion of the case.       VIRGINIA Rivera MD

## 2025-06-11 ENCOUNTER — APPOINTMENT (OUTPATIENT)
Dept: GENERAL RADIOLOGY | Facility: HOSPITAL | Age: 70
End: 2025-06-11
Attending: EMERGENCY MEDICINE
Payer: MEDICARE

## 2025-06-11 ENCOUNTER — TELEPHONE (OUTPATIENT)
Dept: ORTHOPEDICS CLINIC | Facility: CLINIC | Age: 70
End: 2025-06-11

## 2025-06-11 ENCOUNTER — APPOINTMENT (OUTPATIENT)
Dept: ULTRASOUND IMAGING | Facility: HOSPITAL | Age: 70
End: 2025-06-11
Attending: EMERGENCY MEDICINE
Payer: MEDICARE

## 2025-06-11 ENCOUNTER — HOSPITAL ENCOUNTER (EMERGENCY)
Facility: HOSPITAL | Age: 70
Discharge: HOME | End: 2025-06-11
Attending: EMERGENCY MEDICINE
Payer: MEDICARE

## 2025-06-11 ENCOUNTER — TELEPHONE (OUTPATIENT)
Dept: SURGERY | Facility: CLINIC | Age: 70
End: 2025-06-11

## 2025-06-11 VITALS
HEIGHT: 69 IN | WEIGHT: 166 LBS | DIASTOLIC BLOOD PRESSURE: 65 MMHG | BODY MASS INDEX: 24.59 KG/M2 | OXYGEN SATURATION: 98 % | RESPIRATION RATE: 14 BRPM | HEART RATE: 64 BPM | SYSTOLIC BLOOD PRESSURE: 110 MMHG | TEMPERATURE: 98 F

## 2025-06-11 DIAGNOSIS — Z98.890 STATUS POST SURGERY: Primary | ICD-10-CM

## 2025-06-11 DIAGNOSIS — M79.604 LOWER EXTREMITY PAIN, BILATERAL: ICD-10-CM

## 2025-06-11 DIAGNOSIS — M79.605 LOWER EXTREMITY PAIN, BILATERAL: ICD-10-CM

## 2025-06-11 DIAGNOSIS — R60.0 PERIPHERAL EDEMA: Primary | ICD-10-CM

## 2025-06-11 DIAGNOSIS — M79.89 SWELLING OF LOWER EXTREMITY: Primary | ICD-10-CM

## 2025-06-11 LAB
ALBUMIN SERPL-MCNC: 5.2 G/DL (ref 3.2–4.8)
ALBUMIN/GLOB SERPL: 1.8 {RATIO} (ref 1–2)
ALP LIVER SERPL-CCNC: 75 U/L (ref 45–117)
ALT SERPL-CCNC: 12 U/L (ref 10–49)
ANION GAP SERPL CALC-SCNC: 9 MMOL/L (ref 0–18)
AST SERPL-CCNC: 18 U/L (ref ?–34)
BASOPHILS # BLD AUTO: 0.08 X10(3) UL (ref 0–0.2)
BASOPHILS NFR BLD AUTO: 0.6 %
BILIRUB SERPL-MCNC: 0.6 MG/DL (ref 0.2–1.1)
BUN BLD-MCNC: 21 MG/DL (ref 9–23)
CALCIUM BLD-MCNC: 9.9 MG/DL (ref 8.7–10.6)
CHLORIDE SERPL-SCNC: 104 MMOL/L (ref 98–112)
CO2 SERPL-SCNC: 28 MMOL/L (ref 21–32)
CREAT BLD-MCNC: 0.83 MG/DL (ref 0.7–1.3)
EGFRCR SERPLBLD CKD-EPI 2021: 94 ML/MIN/1.73M2 (ref 60–?)
EOSINOPHIL # BLD AUTO: 0.75 X10(3) UL (ref 0–0.7)
EOSINOPHIL NFR BLD AUTO: 5.6 %
ERYTHROCYTE [DISTWIDTH] IN BLOOD BY AUTOMATED COUNT: 14.1 %
GLOBULIN PLAS-MCNC: 2.9 G/DL (ref 2–3.5)
GLUCOSE BLD-MCNC: 101 MG/DL (ref 70–99)
HCT VFR BLD AUTO: 40 % (ref 39–53)
HGB BLD-MCNC: 13 G/DL (ref 13–17.5)
IMM GRANULOCYTES # BLD AUTO: 0.15 X10(3) UL (ref 0–1)
IMM GRANULOCYTES NFR BLD: 1.1 %
LYMPHOCYTES # BLD AUTO: 1.75 X10(3) UL (ref 1–4)
LYMPHOCYTES NFR BLD AUTO: 13.1 %
MCH RBC QN AUTO: 28.8 PG (ref 26–34)
MCHC RBC AUTO-ENTMCNC: 32.5 G/DL (ref 31–37)
MCV RBC AUTO: 88.5 FL (ref 80–100)
MONOCYTES # BLD AUTO: 0.97 X10(3) UL (ref 0.1–1)
MONOCYTES NFR BLD AUTO: 7.3 %
NEUTROPHILS # BLD AUTO: 9.65 X10 (3) UL (ref 1.5–7.7)
NEUTROPHILS # BLD AUTO: 9.65 X10(3) UL (ref 1.5–7.7)
NEUTROPHILS NFR BLD AUTO: 72.3 %
NT-PROBNP SERPL-MCNC: 188 PG/ML (ref ?–125)
OSMOLALITY SERPL CALC.SUM OF ELEC: 295 MOSM/KG (ref 275–295)
PLATELET # BLD AUTO: 282 10(3)UL (ref 150–450)
POTASSIUM SERPL-SCNC: 4.9 MMOL/L (ref 3.5–5.1)
PROT SERPL-MCNC: 8.1 G/DL (ref 5.7–8.2)
RBC # BLD AUTO: 4.52 X10(6)UL (ref 3.8–5.8)
SODIUM SERPL-SCNC: 141 MMOL/L (ref 136–145)
WBC # BLD AUTO: 13.4 X10(3) UL (ref 4–11)

## 2025-06-11 PROCEDURE — 93970 EXTREMITY STUDY: CPT | Performed by: EMERGENCY MEDICINE

## 2025-06-11 PROCEDURE — 83880 ASSAY OF NATRIURETIC PEPTIDE: CPT | Performed by: EMERGENCY MEDICINE

## 2025-06-11 PROCEDURE — 93010 ELECTROCARDIOGRAM REPORT: CPT

## 2025-06-11 PROCEDURE — 99284 EMERGENCY DEPT VISIT MOD MDM: CPT

## 2025-06-11 PROCEDURE — 36415 COLL VENOUS BLD VENIPUNCTURE: CPT

## 2025-06-11 PROCEDURE — 71045 X-RAY EXAM CHEST 1 VIEW: CPT | Performed by: EMERGENCY MEDICINE

## 2025-06-11 PROCEDURE — 93005 ELECTROCARDIOGRAM TRACING: CPT

## 2025-06-11 PROCEDURE — 99285 EMERGENCY DEPT VISIT HI MDM: CPT

## 2025-06-11 PROCEDURE — 85025 COMPLETE CBC W/AUTO DIFF WBC: CPT | Performed by: EMERGENCY MEDICINE

## 2025-06-11 PROCEDURE — 80053 COMPREHEN METABOLIC PANEL: CPT | Performed by: EMERGENCY MEDICINE

## 2025-06-11 RX ORDER — FUROSEMIDE 20 MG/1
20 TABLET ORAL DAILY
Qty: 3 TABLET | Refills: 0 | Status: SHIPPED | OUTPATIENT
Start: 2025-06-11 | End: 2025-06-14

## 2025-06-11 NOTE — ED PROVIDER NOTES
Patient Seen in: [facility] Emergency Department        History  Chief Complaint   Patient presents with    Swelling Edema     Stated Complaint: edema    Subjective:   HPI            70-year-old male comes to the hospital plaint having difficulty with leg edema bilaterally.  He says that he had back surgery done 15 days ago.  He says even after the surgery his legs were swollen and they remain swollen.  He spoke with his physician who wanted him to come to the emergency rule out a blood clot.  He is denying any headaches or dizziness.  No chest pain or troubles breathing.  He denies any abdominal pains.  No nausea or vomiting.  He has no history of having blood clots in the past.  Denies any other complaints at this time.      Objective:     Past Medical History:    Back problem    cervical spine    Diabetes (HCC)    Type 2    Fatty liver    resolved    High blood pressure    High cholesterol    OTHER DISEASES    tachycardia    Psoriasis    Worse in the winter    PSVT (paroxysmal supraventricular tachycardia) (HCC)    Sleep apnea    AHI 45 RDI 47 REM AHI 0 Supine AHI 45 non-supine AHI 0.0 Sao2 Manjinder 90%    Spinal stenosis    Visual impairment    reading glasses              Past Surgical History:   Procedure Laterality Date    Back surgery  2018    C3-C6 ACDF     Cardiac catheterization      No coronary lesions    Cervical spine surgery      Repair ing hernia,4+y/o,reducibl                  Social History     Socioeconomic History    Marital status:     Number of children: 1   Occupational History    Occupation: [employer]     Employer: [id number]   Tobacco Use    Smoking status: Former     Current packs/day: 0.00     Types: Cigarettes     Quit date: 1981     Years since quittin.4     Passive exposure: Never    Smokeless tobacco: Never   Vaping Use    Vaping status: Never Used   Substance and Sexual Activity    Alcohol use: Not Currently    Drug use: No   Other Topics Concern     Exercise No    Seat Belt Yes     Social Drivers of Health     Food Insecurity: No Food Insecurity (5/27/2025)    Received from [facility] - Food Insecurity     Worried About Running Out of Food in the Last Year: No     Ran Out of Food in the Last Year: No   Transportation Needs: No Transportation Needs (5/27/2025)    Received from [facility] - Transportation     Lack of Transportation: No   Housing Stability: Not At Risk (5/27/2025)    Received from [facility] - Housing/Utilities     Has Housing: Yes     Worried About Losing Housing: No     Unable to Get Utilities: No                                Physical Exam    ED Triage Vitals [06/11/25 1347]   /55   Pulse 81   Resp 17   Temp 97.5 °F (36.4 °C)   Temp src Oral   SpO2 99 %   O2 Device None (Room air)       Current Vitals:   Vital Signs  BP: 124/46  Pulse: 62  Resp: 14  Temp: 97.5 °F (36.4 °C)  Temp src: Oral  MAP (mmHg): 80    Oxygen Therapy  SpO2: 98 %  O2 Device: None (Room air)            Physical Exam  HEENT; NCAT, EOMI, throat clear, neck supple, no LAD, no JVD  Heart S1S2 RRR  lungs: CTAB  abd: Soft NT, ND,  NABS without rebound or guarding  Back wound is clean, dry and intact with staples in place without sign of infection without significant tenderness  Ext bilateral lower extremity edema is noted that is pitting without tenderness        ED Course  Labs Reviewed   COMP METABOLIC PANEL (14) - Abnormal; Notable for the following components:       Result Value    Glucose 101 (*)     Albumin 5.2 (*)     All other components within normal limits   CBC WITH DIFFERENTIAL WITH PLATELET - Abnormal; Notable for the following components:    WBC 13.4 (*)     Neutrophil Absolute Prelim 9.65 (*)     Neutrophil Absolute 9.65 (*)     Eosinophil Absolute 0.75 (*)     All other components within normal limits   PRO BETA NATRIURETIC PEPTIDE - Abnormal; Notable for the  following components:    Pro-Beta Natriuretic Peptide 188 (*)     All other components within normal limits   RAINBOW DRAW LAVENDER   RAINBOW DRAW LIGHT GREEN   RAINBOW DRAW BLUE     EKG    Rate, intervals and axes as noted on EKG Report.  Rate: 62  Rhythm: Sinus Rhythm  Reading: , QRS of 82, patient is normal sinus rhythm without ischemic change.           ED Course as of 06/11/25 1852  ------------------------------------------------------------  Time: 06/11 1850  Comment: Where the patient's BNP was 188, the white count was 13.4.  The CMP was unremarkable.  The patient had a venous Doppler ultrasound his legs bilaterally that I interpreted showing no DVT.  I reviewed the radiology report as well.  Patient chest x-ray was negative as well.     US VENOUS DOPPLER LEG BILAT - DIAG IMG (CPT=93970)  Result Date: 6/11/2025  PROCEDURE:  US VENOUS DOPPLER LEG BILAT - DIAG IMG (CPT=93970)  COMPARISON:  None.  INDICATIONS:  Bilateral leg swelling  TECHNIQUE:  Real time, grey scale, and duplex ultrasound was used to evaluate the lower extremity venous system. B-mode two-dimensional images of the vascular structures, Doppler spectral analysis, and color flow.  Doppler imaging were performed.  The following veins were imaged bilaterally:  Common, deep, and superficial femoral, popliteal, sapheno-femoral junction, and posterior tibial veins.  PATIENT STATED HISTORY: (As transcribed by Technologist)     FINDINGS:  SAPHENOFEMORAL JUNCTION:  No reflux. THROMBI:  None visible. COMPRESSION:  Normal compressibility, phasicity, and augmentation. OTHER:  Negative.            CONCLUSION:  No evidence of DVT in either leg.   LOCATION:  [facility]   Dictated by (CST): Miguel Jaramillo MD on 6/11/2025 at 5:53 PM     Finalized by (CST): Miguel Jaramillo MD on 6/11/2025 at 5:54 PM       XR CHEST AP PORTABLE  (CPT=71045)  Result Date: 6/11/2025  PROCEDURE:  XR CHEST AP PORTABLE  (CPT=71045)  TECHNIQUE:  AP chest radiograph was obtained.   COMPARISON:  [facility], XR, XR CHEST AP PORTABLE  (CPT=71010), 12/13/2014, 9:48 AM.  INDICATIONS:  edema  PATIENT STATED HISTORY: (As transcribed by Technologist)  The patient states he has swelling in both legs since his spinal decompression on May 27.    FINDINGS:  Lung volumes are satisfactory.  No focal consolidation.  CP angles are sharp.  No pneumothorax.  Heart and pulmonary vessels are normal caliber allowing for portable technique.  Smooth mediastinal contours.                CONCLUSION:  No evidence of active cardiopulmonary disease.   LOCATION:  [facility]      Dictated by (CST): Dillan Haines MD on 6/11/2025 at 3:43 PM     Finalized by (CST): Dillan Haines MD on 6/11/2025 at 3:44 PM                         MDM     Differential diagnosis included DVT, peripheral edema but not limited such.  Patient's work appears consistent with peripheral edema and at this time the patient be discharged home with outpatient management.    Patient was screened and evaluated during this visit.   As a treating physician attending to the patient, I determined, within reasonable clinical confidence and prior to discharge, that an emergency medical condition was not or was no longer present.  There was no indication for further evaluation, treatment or admission on an emergency basis.       The usual and customary discharge instuctions were discussed given the patient's ER course.  We discussed signs and symptoms that should prompt the patient's immediate return to the emergency department.   Reasonable over the counter and prescription treatment options and Physician follow up plan was discussed.       The patient is discharged in good condition.     This note was prepared using Dragon Medical voice recognition dictation software.  As a result errors may occur.  When identified to these areas have been corrected.  While every attempt is made to correct errors during dictation discrepancies may still exist.  Please contact if there  are any errors.        Medical Decision Making      Disposition and Plan     Clinical Impression:  1. Peripheral edema         Disposition:  Discharge  6/11/2025  6:52 pm    Follow-up:  Aleja Blackman DO  [address] [place] [id number]  [phone]    Schedule an appointment as soon as possible for a visit in 3 day(s)            Medications Prescribed:  Current Discharge Medication List        START taking these medications    Details   furosemide 20 MG Oral Tab Take 1 tablet (20 mg total) by mouth daily for 3 days.  Qty: 3 tablet, Refills: 0                   Supplementary Documentation:

## 2025-06-11 NOTE — TELEPHONE ENCOUNTER
Spoke w/ patient.   s/p L1-L5 decompression and uninstrumented fusion with L1-L2 discectomy 5/27/25.  Patient states he has ongoing swelling in both ankles and feet - onset following surgery while in the hospital. He reports minimal swelling in lower calves. Swelling is not improving and is causing pain. Patient states he has not been up and moving due to muscle spasms in LE but spasms have improved. Denies calf tenderness, redness, warmth. He states he has not contacted his PCP or Cardiologist about this.   Denies fever, night sweats or chills.   Denies any new numbness, tingling or weakness in lower extremities/upper extremities.   Denies loss of balance.   Denies HA  Patient states he stopped taking post op medications a couple days ago.     STAT US Venous Doppler bilateral LE order placed in [facility] epic chart.   Advised patient to go immediately to have US venous doppler completed.   Patient states he will call his daughter now to take him to [facility] to have this completed.   Advised patient if doppler US results are negative, Dr. Rivera recommends he contact his PCP and Cardiologist for LE swelling.     Encouraged patient be up and moving every 45 minutes to 1 hour, increase walking as tolerated.   Continue spine precautions.   Answered patients questions.   Patient verbalized understanding and agreement.

## 2025-06-11 NOTE — ED INITIAL ASSESSMENT (HPI)
Worsening swelling to BLE s/p back surgery 2 weeks ago, advised by PCP to go to ED to r/o DVT. Patient is AOX4; ambulatory with a walker

## 2025-06-12 ENCOUNTER — E-ADVICE (OUTPATIENT)
Dept: CARDIOLOGY | Age: 70
End: 2025-06-12

## 2025-06-12 ENCOUNTER — OFFICE VISIT (OUTPATIENT)
Dept: ORTHOPEDICS CLINIC | Facility: CLINIC | Age: 70
End: 2025-06-12
Payer: MEDICARE

## 2025-06-12 ENCOUNTER — HOSPITAL ENCOUNTER (OUTPATIENT)
Dept: GENERAL RADIOLOGY | Age: 70
Discharge: HOME | End: 2025-06-12
Attending: PHYSICIAN ASSISTANT
Payer: MEDICARE

## 2025-06-12 DIAGNOSIS — Z98.890 STATUS POST SURGERY: ICD-10-CM

## 2025-06-12 DIAGNOSIS — M48.062 SPINAL STENOSIS OF LUMBAR REGION WITH NEUROGENIC CLAUDICATION: ICD-10-CM

## 2025-06-12 DIAGNOSIS — Z98.890 STATUS POST LUMBAR SPINE SURGERY FOR DECOMPRESSION OF SPINAL CORD: ICD-10-CM

## 2025-06-12 DIAGNOSIS — M47.816 LUMBAR SPONDYLOSIS: Primary | ICD-10-CM

## 2025-06-12 PROCEDURE — 72100 X-RAY EXAM L-S SPINE 2/3 VWS: CPT | Performed by: PHYSICIAN ASSISTANT

## 2025-06-12 PROCEDURE — 99024 POSTOP FOLLOW-UP VISIT: CPT | Performed by: PHYSICIAN ASSISTANT

## 2025-06-12 NOTE — PROGRESS NOTES
The following individual(s) verbally consented to be recorded using ambient AI listening technology and understand that they can each withdraw their consent to this listening technology at any point by asking the clinician to turn off or pause the recording:    Patient name: Ananda Perez  Additional names:  wife, and daughter         Name: Ananda Perez   MRN: [id number]  Date: 6/12/2025     HPI:   History of Present Illness  Ananda Perez is a 70 year old male with severe spinal stenosis who presents for post-operative follow-up after L1-5 decompression surgery with incidental durotomy..    He underwent spinal decompression surgery for severe spinal stenosis. Post-operatively, he experiences significant muscle spasms and cramps in his legs, severe enough to interfere with sleep. He uses a walker for mobility and acknowledges its necessity despite initial reluctance. He has not been taking pain medication but has used muscle relaxers, approximately half a bottle, but has since stopped.    There is improvement in leg strength but ongoing weakness, particularly in the left quadriceps, and difficulty with toe raises. He has numbness in his legs, which he attributes to swelling and possibly neuropathy. Sensation has not fully returned. He has not started using water pills to manage swelling due to concerns about frequent bathroom access.  No headaches.     He has a history of psoriasis on his feet and sleep apnea, which he notes can contribute to puffiness. He experiences difficulty sleeping due to discomfort from surgical staples and has been sleeping in a chair.    His family history includes multiple members with back issues, suggesting a genetic component to his condition. He is an  by [employer].    No headaches or back pain post-operatively. He notes numbness in his legs and weakness in his left quadriceps. He reports difficulty sleeping due to discomfort from surgical staples.       PMH:    Past Medical History[1]    PAST SURGICAL HX:  Past Surgical History[2]    FAMILY HX:  Family History[3]    ALLERGIES:  Cleocin and Seasonal    MEDICATIONS: Current Medications[4]    SOCIAL HX:  Social History     Tobacco Use    Smoking status: Former     Current packs/day: 0.00     Types: Cigarettes     Quit date: 1981     Years since quittin.4     Passive exposure: Never    Smokeless tobacco: Never   Substance Use Topics    Alcohol use: Not Currently       PE:     Physical Exam  MUSCULOSKELETAL: incision is C/D/I.  No drainage.      Patient is in no apparent distress.  Alert and oriented times 3.  In the seated position:   Left 5/5 Hip flex Right 5/5 Hip flex     4/5 Knee ext  4+/5 Knee ext    5/5 EHL  5/5 EHL    5/5 DF   5/5 DF    4-/5 PF   4-/5 PF    negative SLR  negative SLR    Deep tendon reflexes 2+ to bilateral patella and Achilles tendons  Clonus is negative to bilateral lower extremities  Calves soft and non tender bilaterally   Sensation is fully intact  Smooth pain free ROM to bilateral Hips, Knees, Ankles      Radiographic Examination/Diagnostics:  I personally viewed, independently interpreted imaging and radiology report was reviewed.  Results  RADIOLOGY  Lumbar spine MRI: Severe stenosis from L1 to S1  X-ray: Full decompression from L1 to L5 with removal of spinous process.  Multi level spondylosis noted.   Images were reviewed and discussed with the patient.  They voiced understanding of what the images showed.    IMPRESSION: Ananda Perez is a 70 year old male   Assessment & Plan  Lumbar Spinal Stenosis  Post-surgical status following decompression surgery from L1 to L5. Experiencing muscle spasms, left quadriceps weakness, numbness, and neuropathy. No headaches. Full recovery expected in one year.  We discussed how important is to be walking.  He will work on walking more.  He will start working with Adriano in physical therapy.  He is making good progress.  He will come in next week  for staple removal.  His concerns were addressed.  - Encourage walking and physical activity.  - Prescribe physical therapy with Adriano for balance training and strengthening, twice a week.  - Advise against lifting more than 10 pounds and repetitive bending or twisting for four weeks.  - Allow gradual increase in lifting restrictions over time, aiming for 30-50 pounds at three months and up to 100 pounds at six months.  - Schedule follow-up for staple removal in a few days.  - Advise taking Tylenol for pain management.  - Avoid driving until confident in covering blind spots, typically 2-4 weeks post-surgery.    Bilateral radiating low back pain  Post-surgical improvement noted. Pain management essential for mobility and recovery.  - Encourage use of Tylenol for pain management.  - Avoid Motrin, Advil, ibuprofen, or Aleve.      Sleep Apnea  Obstructive sleep apnea contributing to puffiness and potential sleep disturbances. Sleeping in a chair due to staple discomfort.  - Advise sleeping on sides to alleviate discomfort from staples until removal.        Ananda and LALA went over imaging, prior treatments and arranged for a plan that incorporated personal goals, social circumstances and any current medical challenges.       ICD-10-CM    1. Lumbar spondylosis  M47.816 PHYSICAL THERAPY - INTERNAL      2. Spinal stenosis of lumbar region with neurogenic claudication  M48.062 PHYSICAL THERAPY - INTERNAL      3. Status post lumbar spine surgery for decompression of spinal cord  Z98.890 PHYSICAL THERAPY - INTERNAL                 Carl Keyes PA-C  [facility]   [email or web address], [place]  t: [phone]  f: [phone]    Greater than 30 minutes of total time was required in the care of the patient today. Time includes evaluation of prior and current imaging and review of records.  Note to patient: The 21st Century Cures Act makes medical notes like these available to patients in the interest of transparency.  However, be advised this is a medical document. It is intended primarily as peer to peer communication, is written in medical language, and may contain abbreviations or verbiage that are unfamiliar. This document is intended to carry relevant information, facts as evident, and the clinical opinion of the practitioner at the time of writing.          [1]   Past Medical History:   Back problem    cervical spine    Diabetes (HCC)    Type 2    Fatty liver    resolved    High blood pressure    High cholesterol    OTHER DISEASES    tachycardia    Psoriasis    Worse in the winter    PSVT (paroxysmal supraventricular tachycardia) (HCC)    Sleep apnea    AHI 45 RDI 47 REM AHI 0 Supine AHI 45 non-supine AHI 0.0 Sao2 Manjinder 90%    Spinal stenosis    Visual impairment    reading glasses   [2]   Past Surgical History:  Procedure Laterality Date    Back surgery  04/30/2018    C3-C6 ACDF     Cardiac catheterization  1997    No coronary lesions    Cervical spine surgery      Repair ing hernia,4+y/o,reducibl     [3]   Family History  Problem Relation Age of Onset    Hypertension Father     Heart Disorder Father     Hypertension Mother     Heart Disorder Mother     Other (HOLLAND) Brother    [4]   Current Outpatient Medications   Medication Sig Dispense Refill    furosemide 20 MG Oral Tab Take 1 tablet (20 mg total) by mouth daily for 3 days. 3 tablet 0    aspirin 81 MG Oral Tab EC Take 1 tablet (81 mg total) by mouth daily.      lisinopril 5 MG Oral Tab Take 1 tablet (5 mg total) by mouth in the morning.      JARDIANCE 25 MG Oral Tab Take 1 tablet (25 mg total) by mouth in the morning.      Microlet Lancets Does not apply Misc Check BG  2 times daily 100 each 6    atorvastatin 20 MG Oral Tab Take 1 tablet (20 mg total) by mouth in the morning.      Glucose Blood (CONTOUR NEXT TEST) In Vitro Strip Test BID dx E11.9 180 each 3    Blood Glucose Monitoring Suppl (CONTOUR NEXT MONITOR) w/Device Does not apply Kit 1 each by Does not apply  route 2 (two) times a day. dx E11.9 1 kit 0    EZETIMIBE 10 MG Oral Tab TAKE 1 TABLET BY MOUTH DAILY 90 tablet 0    metoprolol Tartrate 25 MG Oral Tab Take 1 tablet (25 mg total) by mouth 2 (two) times daily. 180 tablet 3    digoxin 0.25 MG Oral Tab Take 1 tablet (0.25 mg total) by mouth daily. 90 tablet 3    Levocetirizine Dihydrochloride 5 MG Oral Tab Take 1 tablet (5 mg total) by mouth in the morning.      Turmeric 500 MG Oral Cap Take 1 capsule by mouth in the morning.      omega-3 fatty acids (FISH OIL) 1000 MG Oral Cap Take 1,000 mg by mouth in the morning.      mupirocin 2 % External Ointment Apply ointment twice a day for five days prior to surgery - once in the morning and once in the evening 22 g 0

## 2025-06-13 ENCOUNTER — PATIENT MESSAGE (OUTPATIENT)
Dept: ORTHOPEDICS CLINIC | Facility: CLINIC | Age: 70
End: 2025-06-13

## 2025-06-13 LAB
ATRIAL RATE: 62 BPM
P AXIS: 43 DEGREES
P-R INTERVAL: 238 MS
Q-T INTERVAL: 374 MS
QRS DURATION: 82 MS
QTC CALCULATION (BEZET): 379 MS
R AXIS: 53 DEGREES
T AXIS: 59 DEGREES
VENTRICULAR RATE: 62 BPM

## 2025-06-13 NOTE — TELEPHONE ENCOUNTER
Called and spoke w/ patient.   S/p L1-L5 decompression and uninstrumented fusion with L1-L2 discectomy 5/27/25  Patient states he is not taking the post op medications. He states he did not take them post op. He is taking OTC acetaminophen PRN.   Discussed w/ Dr. Rivera.  Answered patient's questions.  Patient verbalized understanding and agreement.

## 2025-06-17 ENCOUNTER — OFFICE VISIT (OUTPATIENT)
Age: 70
End: 2025-06-17
Payer: MEDICARE

## 2025-06-17 ENCOUNTER — PATIENT MESSAGE (OUTPATIENT)
Age: 70
End: 2025-06-17

## 2025-06-17 DIAGNOSIS — Z98.890 STATUS POST SURGERY: Primary | ICD-10-CM

## 2025-06-17 PROCEDURE — 99024 POSTOP FOLLOW-UP VISIT: CPT | Performed by: PHYSICIAN ASSISTANT

## 2025-06-17 NOTE — PROGRESS NOTES
The following individual(s) verbally consented to be recorded using ambient AI listening technology and understand that they can each withdraw their consent to this listening technology at any point by asking the clinician to turn off or pause the recording:    Patient name: Ananda Perez  Additional names:  wife         Name: Ananda Perez   MRN: [id number]  Date: 2025     HPI:   History of Present Illness  Ananda Perez is a 70 year old male who presents 3 weeks after L1-5 decompression surgery with incidental durotomy..     He experiences leg pain and swelling following recent surgery, described as general soreness rather than spasms, attributed to reduced mobility. The swelling is persistent, managed with diuretics, but he is cautious due to frequent urination.  He went to the emergency room at [place] regarding this and diuretics were prescribed.  He has not follow-up with his primary care doctor yet.  No new leg pain, numbness, or tenderness in the cast area. He has tightness and reduced sensation in his feet, which may not improve until up to twelve months post-surgery.  He has been told he has neuropathy.  No headaches    He is a retired  who enjoys working on electrical and mechanical projects at home. 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       PMH:   Past Medical History[1]    PAST SURGICAL HX:  Past Surgical History[2]    FAMILY HX:  Family History[3]    ALLERGIES:  Cleocin and Seasonal    MEDICATIONS: Current Medications[4]    SOCIAL HX:  Social History     Tobacco Use    Smoking status: Former     Current packs/day: 0.00     Types: Cigarettes     Quit date: 1981     Years since quittin.4     Passive exposure: Never    Smokeless tobacco: Never   Substance Use Topics    Alcohol use: Not Currently       PE:     Physical Exam  MUSCULOSKELETAL: No tenderness in the back with cast.  SKIN: Staples ready for removal.  Surgical site appears well-healed.  Staples were removed and sutures were cut.  Steri-Strips were placed on.  Incision is clean dry and intact.     Patient is in no apparent distress.  Alert and oriented times 3.  In the seated position:   Left 5/5 Hip flex Right 5/5 Hip flex     4/5 Knee ext  4+/5 Knee ext    5/5 EHL  5/5 EHL    5/5 DF   5/5 DF    4-/5 PF   4-/5 PF    negative SLR  negative SLR    Deep tendon reflexes 2+ to bilateral patella and Achilles tendons  Clonus is negative to bilateral lower extremities  Calves soft and non tender bilaterally   Sensation is fully intact  Smooth pain free ROM to bilateral Hips, Knees, Ankles      Radiographic Examination/Diagnostics:  I personally viewed, independently interpreted imaging and radiology report was reviewed.  Results  Procedure: Staple Removal  Description: Staples were removed from the surgical site. Two stitches were cut. No significant pain or complications noted during the procedure.          IMPRESSION: Ananda Perez is a 70 year old male   Assessment & Plan  Patient continues to make progress.  His leg pain is decreasing.  He is off all pain medicines.  He understands his restrictions.  He can get his incision wet in the shower now.  Just no submerging underwater.  Lifting restrictions were discussed.  Follow-up in 3 weeks.  At that time Dr. Rivera may let him be more active in the backyard.  Patient's concerns were addressed  Restrictions and limitations were reviewed with the patient.  Concerns were addressed.  Questions were encouraged and answered.  Patient voiced understanding.              Ananda and LALA went over imaging, prior treatments and arranged for a plan that incorporated personal goals, social circumstances and any current medical challenges.     No diagnosis found.           Carl Keyes PA-C  [facility]   [email or web address], [email or web address]  t: [phone]  f: [phone]    Greater than 30 minutes of total time was required in  the care of the patient today. Time includes evaluation of prior and current imaging and review of records.  Note to patient: The 21st Century Cures Act makes medical notes like these available to patients in the interest of transparency. However, be advised this is a medical document. It is intended primarily as peer to peer communication, is written in medical language, and may contain abbreviations or verbiage that are unfamiliar. This document is intended to carry relevant information, facts as evident, and the clinical opinion of the practitioner at the time of writing.            [1]   Past Medical History:   Back problem    cervical spine    Diabetes (HCC)    Type 2    Fatty liver    resolved    High blood pressure    High cholesterol    OTHER DISEASES    tachycardia    Psoriasis    Worse in the winter    PSVT (paroxysmal supraventricular tachycardia) (HCC)    Sleep apnea    AHI 45 RDI 47 REM AHI 0 Supine AHI 45 non-supine AHI 0.0 Sao2 Manjinder 90%    Spinal stenosis    Visual impairment    reading glasses   [2]   Past Surgical History:  Procedure Laterality Date    Back surgery  04/30/2018    C3-C6 ACDF     Cardiac catheterization  1997    No coronary lesions    Cervical spine surgery      Repair ing hernia,4+y/o,reducibl     [3]   Family History  Problem Relation Age of Onset    Hypertension Father     Heart Disorder Father     Hypertension Mother     Heart Disorder Mother     Other (HOLLAND) Brother    [4]   Current Outpatient Medications   Medication Sig Dispense Refill    aspirin 81 MG Oral Tab EC Take 1 tablet (81 mg total) by mouth daily.      lisinopril 5 MG Oral Tab Take 1 tablet (5 mg total) by mouth in the morning.      JARDIANCE 25 MG Oral Tab Take 1 tablet (25 mg total) by mouth in the morning.      Microlet Lancets Does not apply Misc Check BG  2 times daily 100 each 6    atorvastatin 20 MG Oral Tab Take 1 tablet (20 mg total) by mouth in the morning.      Glucose Blood (CONTOUR NEXT TEST) In Vitro  Strip Test BID dx E11.9 180 each 3    Blood Glucose Monitoring Suppl (CONTOUR NEXT MONITOR) w/Device Does not apply Kit 1 each by Does not apply route 2 (two) times a day. dx E11.9 1 kit 0    EZETIMIBE 10 MG Oral Tab TAKE 1 TABLET BY MOUTH DAILY 90 tablet 0    metoprolol Tartrate 25 MG Oral Tab Take 1 tablet (25 mg total) by mouth 2 (two) times daily. 180 tablet 3    digoxin 0.25 MG Oral Tab Take 1 tablet (0.25 mg total) by mouth daily. 90 tablet 3    Levocetirizine Dihydrochloride 5 MG Oral Tab Take 1 tablet (5 mg total) by mouth in the morning.      Turmeric 500 MG Oral Cap Take 1 capsule by mouth in the morning.      omega-3 fatty acids (FISH OIL) 1000 MG Oral Cap Take 1,000 mg by mouth in the morning.

## 2025-07-01 DIAGNOSIS — I47.10 SVT (SUPRAVENTRICULAR TACHYCARDIA) (CMD): ICD-10-CM

## 2025-07-01 DIAGNOSIS — I47.10 PSVT (PAROXYSMAL SUPRAVENTRICULAR TACHYCARDIA) (CMD): ICD-10-CM

## 2025-07-01 DIAGNOSIS — E78.2 MIXED HYPERLIPIDEMIA: ICD-10-CM

## 2025-07-01 RX ORDER — ATORVASTATIN CALCIUM 20 MG/1
20 TABLET, FILM COATED ORAL DAILY
Qty: 90 TABLET | Refills: 3 | OUTPATIENT
Start: 2025-07-01

## 2025-07-01 RX ORDER — LISINOPRIL 5 MG/1
5 TABLET ORAL DAILY
Qty: 90 TABLET | Refills: 3 | OUTPATIENT
Start: 2025-07-01

## 2025-07-07 ENCOUNTER — PATIENT MESSAGE (OUTPATIENT)
Age: 70
End: 2025-07-07

## 2025-07-07 DIAGNOSIS — I47.10 PSVT (PAROXYSMAL SUPRAVENTRICULAR TACHYCARDIA) (CMD): ICD-10-CM

## 2025-07-07 DIAGNOSIS — E78.2 MIXED HYPERLIPIDEMIA: ICD-10-CM

## 2025-07-07 DIAGNOSIS — I47.10 SVT (SUPRAVENTRICULAR TACHYCARDIA) (CMD): ICD-10-CM

## 2025-07-07 RX ORDER — DIGOXIN 250 MCG
250 TABLET ORAL DAILY
Qty: 90 TABLET | Refills: 0 | Status: SHIPPED | OUTPATIENT
Start: 2025-07-07 | End: 2026-01-03

## 2025-07-08 ENCOUNTER — LAB SERVICES (OUTPATIENT)
Dept: LAB | Age: 70
End: 2025-07-08

## 2025-07-08 ENCOUNTER — RESULTS FOLLOW-UP (OUTPATIENT)
Dept: CARDIOLOGY | Age: 70
End: 2025-07-08

## 2025-07-08 DIAGNOSIS — Z79.899 ENCOUNTER FOR MONITORING DIGOXIN THERAPY: ICD-10-CM

## 2025-07-08 DIAGNOSIS — I10 ESSENTIAL HYPERTENSION, BENIGN: ICD-10-CM

## 2025-07-08 DIAGNOSIS — I47.10 SVT (SUPRAVENTRICULAR TACHYCARDIA) (CMD): ICD-10-CM

## 2025-07-08 DIAGNOSIS — Z51.81 ENCOUNTER FOR MONITORING DIGOXIN THERAPY: ICD-10-CM

## 2025-07-08 DIAGNOSIS — E87.5 HYPERKALEMIA: ICD-10-CM

## 2025-07-08 DIAGNOSIS — E78.2 MIXED HYPERLIPIDEMIA: ICD-10-CM

## 2025-07-08 DIAGNOSIS — I47.10 PSVT (PAROXYSMAL SUPRAVENTRICULAR TACHYCARDIA) (CMD): Primary | ICD-10-CM

## 2025-07-08 LAB
ALBUMIN SERPL-MCNC: 4.2 G/DL (ref 3.4–5)
ALBUMIN/GLOB SERPL: 1.2 {RATIO} (ref 1–2.4)
ALP SERPL-CCNC: 98 UNITS/L (ref 45–117)
ALT SERPL-CCNC: 26 UNITS/L
ANION GAP SERPL CALC-SCNC: 14 MMOL/L (ref 7–19)
AST SERPL-CCNC: 19 UNITS/L
BILIRUB SERPL-MCNC: 0.8 MG/DL (ref 0.2–1)
BUN SERPL-MCNC: 22 MG/DL (ref 6–20)
BUN/CREAT SERPL: 37 (ref 7–25)
CALCIUM SERPL-MCNC: 9.5 MG/DL (ref 8.4–10.2)
CHLORIDE SERPL-SCNC: 103 MMOL/L (ref 97–110)
CO2 SERPL-SCNC: 29 MMOL/L (ref 21–32)
CREAT SERPL-MCNC: 0.59 MG/DL (ref 0.67–1.17)
DIGOXIN SERPL-MCNC: 0.5 NG/ML (ref 0.8–2.1)
EGFRCR SERPLBLD CKD-EPI 2021: >90 ML/MIN/{1.73_M2}
FASTING DURATION TIME PATIENT: ABNORMAL H
GLOBULIN SER-MCNC: 3.4 G/DL (ref 2–4)
GLUCOSE SERPL-MCNC: 107 MG/DL (ref 70–99)
POTASSIUM SERPL-SCNC: 4.6 MMOL/L (ref 3.4–5.1)
PROT SERPL-MCNC: 7.6 G/DL (ref 6.4–8.2)
SODIUM SERPL-SCNC: 141 MMOL/L (ref 135–145)

## 2025-07-08 PROCEDURE — 80053 COMPREHEN METABOLIC PANEL: CPT | Performed by: INTERNAL MEDICINE

## 2025-07-08 PROCEDURE — 80162 ASSAY OF DIGOXIN TOTAL: CPT | Performed by: CLINICAL MEDICAL LABORATORY

## 2025-07-08 PROCEDURE — 36415 COLL VENOUS BLD VENIPUNCTURE: CPT | Performed by: INTERNAL MEDICINE

## 2025-07-16 ENCOUNTER — TELEPHONE (OUTPATIENT)
Dept: CARDIOLOGY | Age: 70
End: 2025-07-16

## 2025-07-18 ENCOUNTER — APPOINTMENT (OUTPATIENT)
Dept: CARDIOLOGY | Age: 70
End: 2025-07-18
Attending: INTERNAL MEDICINE

## 2025-07-22 ENCOUNTER — APPOINTMENT (OUTPATIENT)
Dept: CARDIOLOGY | Age: 70
End: 2025-07-22
Attending: INTERNAL MEDICINE

## 2025-07-22 ENCOUNTER — TELEPHONE (OUTPATIENT)
Dept: CARDIOLOGY | Age: 70
End: 2025-07-22

## 2025-07-22 ENCOUNTER — PATIENT MESSAGE (OUTPATIENT)
Facility: CLINIC | Age: 70
End: 2025-07-22

## 2025-07-22 DIAGNOSIS — I47.10 PSVT (PAROXYSMAL SUPRAVENTRICULAR TACHYCARDIA) (CMD): ICD-10-CM

## 2025-07-22 DIAGNOSIS — R68.89 ABNORMAL ANKLE BRACHIAL INDEX (ABI): Primary | ICD-10-CM

## 2025-07-22 DIAGNOSIS — I73.9 PVD (PERIPHERAL VASCULAR DISEASE) (CMD): ICD-10-CM

## 2025-07-22 DIAGNOSIS — E11.59 TYPE 2 DIABETES MELLITUS WITH OTHER CIRCULATORY COMPLICATIONS (CMD): ICD-10-CM

## 2025-07-22 DIAGNOSIS — E11.29 DIABETES MELLITUS WITH MICROALBUMINURIA  (CMD): ICD-10-CM

## 2025-07-22 DIAGNOSIS — I47.10 SVT (SUPRAVENTRICULAR TACHYCARDIA) (CMD): ICD-10-CM

## 2025-07-22 DIAGNOSIS — E11.51 TYPE 2 DIABETES MELLITUS WITH DIABETIC PERIPHERAL ANGIOPATHY WITHOUT GANGRENE, WITHOUT LONG-TERM CURRENT USE OF INSULIN  (CMD): ICD-10-CM

## 2025-07-22 DIAGNOSIS — R80.9 DIABETES MELLITUS WITH MICROALBUMINURIA  (CMD): ICD-10-CM

## 2025-07-22 DIAGNOSIS — I10 ESSENTIAL HYPERTENSION, BENIGN: ICD-10-CM

## 2025-07-22 DIAGNOSIS — E78.2 MIXED HYPERLIPIDEMIA: ICD-10-CM

## 2025-07-22 PROCEDURE — 93922 UPR/L XTREMITY ART 2 LEVELS: CPT | Performed by: INTERNAL MEDICINE

## 2025-07-24 ENCOUNTER — ANCILLARY PROCEDURE (OUTPATIENT)
Dept: CARDIOLOGY | Age: 70
End: 2025-07-24
Attending: INTERNAL MEDICINE

## 2025-07-24 DIAGNOSIS — R68.89 ABNORMAL ANKLE BRACHIAL INDEX (ABI): ICD-10-CM

## 2025-07-24 DIAGNOSIS — E11.51 TYPE 2 DIABETES MELLITUS WITH DIABETIC PERIPHERAL ANGIOPATHY WITHOUT GANGRENE, WITHOUT LONG-TERM CURRENT USE OF INSULIN  (CMD): ICD-10-CM

## 2025-07-24 DIAGNOSIS — I73.9 PVD (PERIPHERAL VASCULAR DISEASE) (CMD): ICD-10-CM

## 2025-07-24 PROCEDURE — 93925 LOWER EXTREMITY STUDY: CPT | Performed by: INTERNAL MEDICINE

## 2025-07-29 ENCOUNTER — LAB SERVICES (OUTPATIENT)
Dept: LAB | Age: 70
End: 2025-07-29

## 2025-07-29 DIAGNOSIS — I73.9 PAD (PERIPHERAL ARTERY DISEASE) (CMD): ICD-10-CM

## 2025-07-29 LAB
ANION GAP SERPL CALC-SCNC: 12 MMOL/L (ref 7–19)
BUN SERPL-MCNC: 28 MG/DL (ref 6–20)
BUN/CREAT SERPL: 38 (ref 7–25)
CALCIUM SERPL-MCNC: 9.6 MG/DL (ref 8.4–10.2)
CHLORIDE SERPL-SCNC: 103 MMOL/L (ref 97–110)
CO2 SERPL-SCNC: 32 MMOL/L (ref 21–32)
CREAT SERPL-MCNC: 0.73 MG/DL (ref 0.67–1.17)
EGFRCR SERPLBLD CKD-EPI 2021: >90 ML/MIN/{1.73_M2}
FASTING DURATION TIME PATIENT: ABNORMAL H
GLUCOSE SERPL-MCNC: 112 MG/DL (ref 70–99)
POTASSIUM SERPL-SCNC: 5.2 MMOL/L (ref 3.4–5.1)
SODIUM SERPL-SCNC: 142 MMOL/L (ref 135–145)

## 2025-07-29 PROCEDURE — 36415 COLL VENOUS BLD VENIPUNCTURE: CPT | Performed by: INTERNAL MEDICINE

## 2025-07-29 PROCEDURE — 80048 BASIC METABOLIC PNL TOTAL CA: CPT | Performed by: INTERNAL MEDICINE

## 2025-07-30 ENCOUNTER — APPOINTMENT (OUTPATIENT)
Dept: CARDIOLOGY | Age: 70
End: 2025-07-30

## 2025-07-31 ENCOUNTER — HOSPITAL ENCOUNTER (OUTPATIENT)
Dept: CT IMAGING | Age: 70
Discharge: HOME | End: 2025-07-31
Attending: INTERNAL MEDICINE

## 2025-07-31 DIAGNOSIS — I73.9 PAD (PERIPHERAL ARTERY DISEASE) (CMD): ICD-10-CM

## 2025-07-31 PROCEDURE — 10002805 HB CONTRAST AGENT: Performed by: INTERNAL MEDICINE

## 2025-07-31 PROCEDURE — 75635 CT ANGIO ABDOMINAL ARTERIES: CPT

## 2025-07-31 RX ADMIN — IOHEXOL 100 ML: 350 INJECTION, SOLUTION INTRAVENOUS at 14:24

## 2025-08-01 ENCOUNTER — TELEPHONE (OUTPATIENT)
Dept: CARDIOLOGY | Age: 70
End: 2025-08-01

## 2025-08-11 ENCOUNTER — E-ADVICE (OUTPATIENT)
Dept: CARDIOLOGY | Age: 70
End: 2025-08-11

## 2025-08-11 DIAGNOSIS — E11.9 TYPE 2 DIABETES MELLITUS WITHOUT COMPLICATION, WITHOUT LONG-TERM CURRENT USE OF INSULIN (CMD): ICD-10-CM

## 2025-08-11 DIAGNOSIS — Z13.6 ENCOUNTER FOR SCREENING FOR VASCULAR DISEASE: Primary | ICD-10-CM

## 2025-08-11 DIAGNOSIS — E78.2 MIXED HYPERLIPIDEMIA: ICD-10-CM

## 2025-08-11 DIAGNOSIS — I73.9 PVD (PERIPHERAL VASCULAR DISEASE) (CMD): ICD-10-CM

## 2025-08-11 DIAGNOSIS — Z09 ENCOUNTER FOR FOLLOW-UP EXAMINATION AFTER COMPLETED TREATMENT FOR CONDITIONS OTHER THAN MALIGNANT NEOPLASM: ICD-10-CM

## 2025-08-28 ENCOUNTER — OFFICE VISIT (OUTPATIENT)
Facility: CLINIC | Age: 70
End: 2025-08-28

## 2025-08-28 VITALS
WEIGHT: 167 LBS | HEIGHT: 69 IN | SYSTOLIC BLOOD PRESSURE: 124 MMHG | DIASTOLIC BLOOD PRESSURE: 54 MMHG | BODY MASS INDEX: 24.73 KG/M2

## 2025-08-28 DIAGNOSIS — I73.9 PAD (PERIPHERAL ARTERY DISEASE): Primary | ICD-10-CM

## 2025-08-28 DIAGNOSIS — I65.29 CAROTID ATHEROSCLEROSIS, UNSPECIFIED LATERALITY: ICD-10-CM

## 2025-08-28 PROCEDURE — 99204 OFFICE O/P NEW MOD 45 MIN: CPT | Performed by: SURGERY

## 2025-08-29 ENCOUNTER — TELEPHONE (OUTPATIENT)
Dept: CARDIOLOGY | Age: 70
End: 2025-08-29

## 2026-05-21 ENCOUNTER — APPOINTMENT (OUTPATIENT)
Dept: CARDIOLOGY | Age: 71
End: 2026-05-21

## (undated) DEVICE — TRANSPOSAL ULTRAFLEX DUO/QUAD ULTRA CART MANIFOLD

## (undated) DEVICE — PAD SACRAL SPAN AID

## (undated) DEVICE — GLOVE SURG TRIUMPH SZ 7

## (undated) DEVICE — NVM5 NEEDLE MODULE M/E

## (undated) DEVICE — SKIN REG/FINE DUAL MARKER, RULER, LABELS: Brand: MEDLINE

## (undated) DEVICE — AGENT HMST STRL KT MTRX THRMB: Type: IMPLANTABLE DEVICE | Site: NECK

## (undated) DEVICE — LAMINECTOMY CDS: Brand: MEDLINE INDUSTRIES, INC.

## (undated) DEVICE — LIGHT HANDLE

## (undated) DEVICE — UNDYED BRAIDED (POLYGLACTIN 910), SYNTHETIC ABSORBABLE SUTURE: Brand: COATED VICRYL

## (undated) DEVICE — PIN DSTRCT 14MM

## (undated) DEVICE — GLOVE SURG TRIUMPH SZ 71/2

## (undated) DEVICE — GLOVE,SURG,SENSICARE,ALOE,LF,PF,7: Brand: MEDLINE

## (undated) DEVICE — NVM5 NEEDLE MODULE S

## (undated) DEVICE — CAUTERY BLADE 2IN INS E1455

## (undated) DEVICE — REMOVER LOT 4OZ N IRRIG UNSCNT SFT MOIST LIQ

## (undated) DEVICE — 3.0MM PRECISION NEURO (MATCH HEAD)

## (undated) DEVICE — Device

## (undated) DEVICE — BANDAGE ADH 1INX3IN NAT FAB N ADH PD CURAD

## (undated) DEVICE — DRILL SRG OIL CRTDG MAESTRO

## (undated) DEVICE — DRAPE TABLE COVER 44X90 TC-10

## (undated) DEVICE — AVANOS* TUOHY EPIDURAL NEEDLE: Brand: AVANOS

## (undated) DEVICE — SYRINGE 10ML SLIP TIP LOSS OF RESIST PLAS

## (undated) DEVICE — MAXCESS C MODULE

## (undated) DEVICE — SUTURE SILK 2-0 SH

## (undated) DEVICE — GLOVE SUR 7.5 SENSICARE PIP WHT PWD F

## (undated) DEVICE — SUTURE VICRYL 2-0 FSL

## (undated) DEVICE — KENDALL SCD EXPRESS SLEEVES, THIGH LENGTH, MEDIUM: Brand: KENDALL SCD

## (undated) DEVICE — #15 STERILE STAINLESS BLADE: Brand: STERILE STAINLESS BLADES

## (undated) DEVICE — 3M™ MICROFOAM™ TAPE 1528-4: Brand: 3M™ MICROFOAM™

## (undated) DEVICE — BANDAGE ROLL,100% COTTON, 6 PLY, LARGE: Brand: KERLIX

## (undated) DEVICE — FLOSEAL SEALENT STERILE 10ML

## (undated) DEVICE — SOL  .9 1000ML BTL

## (undated) DEVICE — DRAIN RELIAVAC W/DRN MED 1/8

## (undated) DEVICE — PAIN TRAY: Brand: MEDLINE INDUSTRIES, INC.